# Patient Record
Sex: MALE | Race: ASIAN | Employment: OTHER | URBAN - METROPOLITAN AREA
[De-identification: names, ages, dates, MRNs, and addresses within clinical notes are randomized per-mention and may not be internally consistent; named-entity substitution may affect disease eponyms.]

---

## 2023-08-01 ENCOUNTER — APPOINTMENT (OUTPATIENT)
Dept: CT IMAGING | Facility: HOSPITAL | Age: 73
End: 2023-08-01
Attending: EMERGENCY MEDICINE

## 2023-08-01 ENCOUNTER — HOSPITAL ENCOUNTER (EMERGENCY)
Facility: HOSPITAL | Age: 73
Discharge: HOME | End: 2023-08-01
Attending: EMERGENCY MEDICINE

## 2023-08-01 ENCOUNTER — APPOINTMENT (OUTPATIENT)
Dept: ULTRASOUND IMAGING | Facility: HOSPITAL | Age: 73
End: 2023-08-01
Attending: EMERGENCY MEDICINE

## 2023-08-01 VITALS
BODY MASS INDEX: 21 KG/M2 | SYSTOLIC BLOOD PRESSURE: 150 MMHG | RESPIRATION RATE: 16 BRPM | HEART RATE: 53 BPM | DIASTOLIC BLOOD PRESSURE: 68 MMHG | WEIGHT: 123 LBS | OXYGEN SATURATION: 100 % | HEIGHT: 64.17 IN | TEMPERATURE: 99 F

## 2023-08-01 DIAGNOSIS — K80.20 CALCULUS OF GALLBLADDER WITHOUT CHOLECYSTITIS WITHOUT OBSTRUCTION: ICD-10-CM

## 2023-08-01 DIAGNOSIS — N18.9 ACUTE RENAL FAILURE SUPERIMPOSED ON CHRONIC KIDNEY DISEASE, UNSPECIFIED CKD STAGE, UNSPECIFIED ACUTE RENAL FAILURE TYPE: Primary | ICD-10-CM

## 2023-08-01 DIAGNOSIS — N19 UREMIA: ICD-10-CM

## 2023-08-01 DIAGNOSIS — N17.9 ACUTE RENAL FAILURE SUPERIMPOSED ON CHRONIC KIDNEY DISEASE, UNSPECIFIED CKD STAGE, UNSPECIFIED ACUTE RENAL FAILURE TYPE: Primary | ICD-10-CM

## 2023-08-01 PROBLEM — I10 PRIMARY HYPERTENSION: Status: ACTIVE | Noted: 2023-08-01

## 2023-08-01 PROBLEM — N18.5 CKD (CHRONIC KIDNEY DISEASE) STAGE 5, GFR LESS THAN 15 ML/MIN (HCC): Status: ACTIVE | Noted: 2023-08-01

## 2023-08-01 LAB
ALBUMIN SERPL-MCNC: 3.2 G/DL (ref 3.4–5)
ALBUMIN/GLOB SERPL: 0.7 {RATIO} (ref 1–2)
ALP LIVER SERPL-CCNC: 100 U/L
ALT SERPL-CCNC: 21 U/L
ANION GAP SERPL CALC-SCNC: 5 MMOL/L (ref 0–18)
AST SERPL-CCNC: 20 U/L (ref 15–37)
ATRIAL RATE: 57 BPM
BASOPHILS # BLD AUTO: 0.01 X10(3) UL (ref 0–0.2)
BASOPHILS NFR BLD AUTO: 0.1 %
BILIRUB SERPL-MCNC: 0.5 MG/DL (ref 0.1–2)
BILIRUB UR QL STRIP.AUTO: NEGATIVE
BUN BLD-MCNC: 61 MG/DL (ref 7–18)
CALCIUM BLD-MCNC: 8.8 MG/DL (ref 8.5–10.1)
CHLORIDE SERPL-SCNC: 104 MMOL/L (ref 98–112)
CLARITY UR REFRACT.AUTO: CLEAR
CO2 SERPL-SCNC: 27 MMOL/L (ref 21–32)
CREAT BLD-MCNC: 6.59 MG/DL
DEPRECATED HBV CORE AB SER IA-ACNC: 209.6 NG/ML
EGFRCR SERPLBLD CKD-EPI 2021: 8 ML/MIN/1.73M2 (ref 60–?)
EOSINOPHIL # BLD AUTO: 0.01 X10(3) UL (ref 0–0.7)
EOSINOPHIL NFR BLD AUTO: 0.1 %
ERYTHROCYTE [DISTWIDTH] IN BLOOD BY AUTOMATED COUNT: 14 %
GLOBULIN PLAS-MCNC: 4.8 G/DL (ref 2.8–4.4)
GLUCOSE BLD-MCNC: 115 MG/DL (ref 70–99)
GLUCOSE UR STRIP.AUTO-MCNC: NEGATIVE MG/DL
HCT VFR BLD AUTO: 35.2 %
HGB BLD-MCNC: 10.9 G/DL
IMM GRANULOCYTES # BLD AUTO: 0.05 X10(3) UL (ref 0–1)
IMM GRANULOCYTES NFR BLD: 0.4 %
IRON SATN MFR SERPL: 13 %
IRON SERPL-MCNC: 39 UG/DL
KETONES UR STRIP.AUTO-MCNC: NEGATIVE MG/DL
LEUKOCYTE ESTERASE UR QL STRIP.AUTO: NEGATIVE
LIPASE SERPL-CCNC: 101 U/L (ref 13–75)
LYMPHOCYTES # BLD AUTO: 1.44 X10(3) UL (ref 1–4)
LYMPHOCYTES NFR BLD AUTO: 12.1 %
MAGNESIUM SERPL-MCNC: 2.7 MG/DL (ref 1.6–2.6)
MCH RBC QN AUTO: 28.9 PG (ref 26–34)
MCHC RBC AUTO-ENTMCNC: 31 G/DL (ref 31–37)
MCV RBC AUTO: 93.4 FL
MONOCYTES # BLD AUTO: 0.75 X10(3) UL (ref 0.1–1)
MONOCYTES NFR BLD AUTO: 6.3 %
NEUTROPHILS # BLD AUTO: 9.61 X10 (3) UL (ref 1.5–7.7)
NEUTROPHILS # BLD AUTO: 9.61 X10(3) UL (ref 1.5–7.7)
NEUTROPHILS NFR BLD AUTO: 81 %
NITRITE UR QL STRIP.AUTO: NEGATIVE
OSMOLALITY SERPL CALC.SUM OF ELEC: 300 MOSM/KG (ref 275–295)
P AXIS: 50 DEGREES
P-R INTERVAL: 178 MS
PH UR STRIP.AUTO: 7 [PH] (ref 5–8)
PLATELET # BLD AUTO: 243 10(3)UL (ref 150–450)
POTASSIUM SERPL-SCNC: 4.8 MMOL/L (ref 3.5–5.1)
PROT SERPL-MCNC: 8 G/DL (ref 6.4–8.2)
PROT UR STRIP.AUTO-MCNC: 100 MG/DL
Q-T INTERVAL: 468 MS
QRS DURATION: 88 MS
QTC CALCULATION (BEZET): 455 MS
R AXIS: -13 DEGREES
RBC # BLD AUTO: 3.77 X10(6)UL
RBC UR QL AUTO: NEGATIVE
SODIUM SERPL-SCNC: 136 MMOL/L (ref 136–145)
SP GR UR STRIP.AUTO: 1.01 (ref 1–1.03)
T AXIS: 87 DEGREES
TIBC SERPL-MCNC: 301 UG/DL (ref 240–450)
TRANSFERRIN SERPL-MCNC: 202 MG/DL (ref 200–360)
TROPONIN I HIGH SENSITIVITY: 15 NG/L
UROBILINOGEN UR STRIP.AUTO-MCNC: <2 MG/DL
VENTRICULAR RATE: 57 BPM
WBC # BLD AUTO: 11.9 X10(3) UL (ref 4–11)

## 2023-08-01 PROCEDURE — 74176 CT ABD & PELVIS W/O CONTRAST: CPT | Performed by: EMERGENCY MEDICINE

## 2023-08-01 PROCEDURE — 76700 US EXAM ABDOM COMPLETE: CPT | Performed by: EMERGENCY MEDICINE

## 2023-08-01 PROCEDURE — 99205 OFFICE O/P NEW HI 60 MIN: CPT | Performed by: INTERNAL MEDICINE

## 2023-08-01 RX ORDER — HYDROMORPHONE HYDROCHLORIDE 1 MG/ML
0.2 INJECTION, SOLUTION INTRAMUSCULAR; INTRAVENOUS; SUBCUTANEOUS EVERY 2 HOUR PRN
Status: CANCELLED | OUTPATIENT
Start: 2023-08-01

## 2023-08-01 RX ORDER — SENNOSIDES 8.6 MG
17.2 TABLET ORAL NIGHTLY PRN
Status: CANCELLED | OUTPATIENT
Start: 2023-08-01

## 2023-08-01 RX ORDER — ACETAMINOPHEN 325 MG/1
650 TABLET ORAL EVERY 4 HOURS PRN
Status: CANCELLED | OUTPATIENT
Start: 2023-08-01

## 2023-08-01 RX ORDER — METOCLOPRAMIDE HYDROCHLORIDE 5 MG/ML
5 INJECTION INTRAMUSCULAR; INTRAVENOUS EVERY 8 HOURS PRN
Status: CANCELLED | OUTPATIENT
Start: 2023-08-01

## 2023-08-01 RX ORDER — SODIUM CHLORIDE 9 MG/ML
INJECTION, SOLUTION INTRAVENOUS CONTINUOUS
Status: DISCONTINUED | OUTPATIENT
Start: 2023-08-01 | End: 2023-08-01

## 2023-08-01 RX ORDER — ONDANSETRON 2 MG/ML
4 INJECTION INTRAMUSCULAR; INTRAVENOUS EVERY 6 HOURS PRN
Status: CANCELLED | OUTPATIENT
Start: 2023-08-01

## 2023-08-01 RX ORDER — HEPARIN SODIUM 5000 [USP'U]/ML
5000 INJECTION, SOLUTION INTRAVENOUS; SUBCUTANEOUS EVERY 12 HOURS SCHEDULED
Status: CANCELLED | OUTPATIENT
Start: 2023-08-01

## 2023-08-01 RX ORDER — HYDROMORPHONE HYDROCHLORIDE 1 MG/ML
0.4 INJECTION, SOLUTION INTRAMUSCULAR; INTRAVENOUS; SUBCUTANEOUS EVERY 2 HOUR PRN
Status: CANCELLED | OUTPATIENT
Start: 2023-08-01

## 2023-08-01 RX ORDER — POLYETHYLENE GLYCOL 3350 17 G/17G
17 POWDER, FOR SOLUTION ORAL DAILY PRN
Status: CANCELLED | OUTPATIENT
Start: 2023-08-01

## 2023-08-01 RX ORDER — HYDROMORPHONE HYDROCHLORIDE 1 MG/ML
0.8 INJECTION, SOLUTION INTRAMUSCULAR; INTRAVENOUS; SUBCUTANEOUS EVERY 2 HOUR PRN
Status: CANCELLED | OUTPATIENT
Start: 2023-08-01

## 2023-08-01 RX ORDER — ONDANSETRON 2 MG/ML
4 INJECTION INTRAMUSCULAR; INTRAVENOUS ONCE
Status: COMPLETED | OUTPATIENT
Start: 2023-08-01 | End: 2023-08-01

## 2023-08-01 RX ORDER — HYDROCODONE BITARTRATE AND ACETAMINOPHEN 5; 325 MG/1; MG/1
2 TABLET ORAL EVERY 4 HOURS PRN
Status: CANCELLED | OUTPATIENT
Start: 2023-08-01

## 2023-08-01 RX ORDER — SODIUM CHLORIDE 9 MG/ML
INJECTION, SOLUTION INTRAVENOUS CONTINUOUS
Status: CANCELLED | OUTPATIENT
Start: 2023-08-01

## 2023-08-01 RX ORDER — MORPHINE SULFATE 2 MG/ML
2 INJECTION, SOLUTION INTRAMUSCULAR; INTRAVENOUS ONCE
Status: COMPLETED | OUTPATIENT
Start: 2023-08-01 | End: 2023-08-01

## 2023-08-01 RX ORDER — MELATONIN
3 NIGHTLY PRN
Status: CANCELLED | OUTPATIENT
Start: 2023-08-01

## 2023-08-01 RX ORDER — HYDROCODONE BITARTRATE AND ACETAMINOPHEN 5; 325 MG/1; MG/1
1 TABLET ORAL EVERY 4 HOURS PRN
Status: CANCELLED | OUTPATIENT
Start: 2023-08-01

## 2023-08-01 RX ORDER — BISACODYL 10 MG
10 SUPPOSITORY, RECTAL RECTAL
Status: CANCELLED | OUTPATIENT
Start: 2023-08-01

## 2023-08-01 NOTE — ED INITIAL ASSESSMENT (HPI)
C.o abd and back pain since 5am today. Denies n/v/d. Endorses difficulty urinating sts he feels the urge but unable to empty. Aox4. Rr even nonlabored.

## 2023-08-07 ENCOUNTER — APPOINTMENT (OUTPATIENT)
Dept: GENERAL RADIOLOGY | Facility: HOSPITAL | Age: 73
End: 2023-08-07
Attending: EMERGENCY MEDICINE

## 2023-08-07 ENCOUNTER — APPOINTMENT (OUTPATIENT)
Dept: GENERAL RADIOLOGY | Facility: HOSPITAL | Age: 73
End: 2023-08-07

## 2023-08-07 ENCOUNTER — HOSPITAL ENCOUNTER (EMERGENCY)
Facility: HOSPITAL | Age: 73
Discharge: HOME | End: 2023-08-07
Attending: EMERGENCY MEDICINE

## 2023-08-07 VITALS
BODY MASS INDEX: 19.77 KG/M2 | RESPIRATION RATE: 16 BRPM | OXYGEN SATURATION: 98 % | WEIGHT: 123 LBS | HEART RATE: 58 BPM | HEIGHT: 66 IN | DIASTOLIC BLOOD PRESSURE: 78 MMHG | TEMPERATURE: 98 F | SYSTOLIC BLOOD PRESSURE: 132 MMHG

## 2023-08-07 DIAGNOSIS — K59.00 CONSTIPATION, UNSPECIFIED CONSTIPATION TYPE: ICD-10-CM

## 2023-08-07 DIAGNOSIS — R06.02 SHORTNESS OF BREATH: Primary | ICD-10-CM

## 2023-08-07 DIAGNOSIS — N18.9 CHRONIC KIDNEY DISEASE, UNSPECIFIED CKD STAGE: ICD-10-CM

## 2023-08-07 LAB
ALBUMIN SERPL-MCNC: 3 G/DL (ref 3.4–5)
ALBUMIN/GLOB SERPL: 0.7 {RATIO} (ref 1–2)
ALP LIVER SERPL-CCNC: 91 U/L
ALT SERPL-CCNC: 21 U/L
ANION GAP SERPL CALC-SCNC: 7 MMOL/L (ref 0–18)
AST SERPL-CCNC: 22 U/L (ref 15–37)
ATRIAL RATE: 68 BPM
BASOPHILS # BLD AUTO: 0.02 X10(3) UL (ref 0–0.2)
BASOPHILS NFR BLD AUTO: 0.2 %
BILIRUB SERPL-MCNC: 0.5 MG/DL (ref 0.1–2)
BUN BLD-MCNC: 57 MG/DL (ref 7–18)
CALCIUM BLD-MCNC: 8.5 MG/DL (ref 8.5–10.1)
CHLORIDE SERPL-SCNC: 99 MMOL/L (ref 98–112)
CO2 SERPL-SCNC: 29 MMOL/L (ref 21–32)
CREAT BLD-MCNC: 6.9 MG/DL
EGFRCR SERPLBLD CKD-EPI 2021: 8 ML/MIN/1.73M2 (ref 60–?)
EOSINOPHIL # BLD AUTO: 0.03 X10(3) UL (ref 0–0.7)
EOSINOPHIL NFR BLD AUTO: 0.3 %
ERYTHROCYTE [DISTWIDTH] IN BLOOD BY AUTOMATED COUNT: 13 %
GLOBULIN PLAS-MCNC: 4.5 G/DL (ref 2.8–4.4)
GLUCOSE BLD-MCNC: 211 MG/DL (ref 70–99)
HCT VFR BLD AUTO: 32.1 %
HGB BLD-MCNC: 10.5 G/DL
IMM GRANULOCYTES # BLD AUTO: 0.05 X10(3) UL (ref 0–1)
IMM GRANULOCYTES NFR BLD: 0.5 %
LIPASE SERPL-CCNC: 94 U/L (ref 13–75)
LYMPHOCYTES # BLD AUTO: 1.52 X10(3) UL (ref 1–4)
LYMPHOCYTES NFR BLD AUTO: 15.5 %
MCH RBC QN AUTO: 29.4 PG (ref 26–34)
MCHC RBC AUTO-ENTMCNC: 32.7 G/DL (ref 31–37)
MCV RBC AUTO: 89.9 FL
MONOCYTES # BLD AUTO: 0.88 X10(3) UL (ref 0.1–1)
MONOCYTES NFR BLD AUTO: 9 %
NEUTROPHILS # BLD AUTO: 7.29 X10 (3) UL (ref 1.5–7.7)
NEUTROPHILS # BLD AUTO: 7.29 X10(3) UL (ref 1.5–7.7)
NEUTROPHILS NFR BLD AUTO: 74.5 %
NT-PROBNP SERPL-MCNC: ABNORMAL PG/ML (ref ?–125)
OSMOLALITY SERPL CALC.SUM OF ELEC: 302 MOSM/KG (ref 275–295)
P AXIS: 23 DEGREES
P-R INTERVAL: 178 MS
PLATELET # BLD AUTO: 232 10(3)UL (ref 150–450)
POTASSIUM SERPL-SCNC: 5 MMOL/L (ref 3.5–5.1)
PROT SERPL-MCNC: 7.5 G/DL (ref 6.4–8.2)
Q-T INTERVAL: 420 MS
QRS DURATION: 86 MS
QTC CALCULATION (BEZET): 446 MS
R AXIS: -20 DEGREES
RBC # BLD AUTO: 3.57 X10(6)UL
SODIUM SERPL-SCNC: 135 MMOL/L (ref 136–145)
T AXIS: 83 DEGREES
TROPONIN I HIGH SENSITIVITY: 21 NG/L
VENTRICULAR RATE: 68 BPM
WBC # BLD AUTO: 9.8 X10(3) UL (ref 4–11)

## 2023-08-07 PROCEDURE — 93010 ELECTROCARDIOGRAM REPORT: CPT

## 2023-08-07 PROCEDURE — 93005 ELECTROCARDIOGRAM TRACING: CPT

## 2023-08-07 PROCEDURE — 84484 ASSAY OF TROPONIN QUANT: CPT | Performed by: EMERGENCY MEDICINE

## 2023-08-07 PROCEDURE — 83690 ASSAY OF LIPASE: CPT | Performed by: EMERGENCY MEDICINE

## 2023-08-07 PROCEDURE — 80053 COMPREHEN METABOLIC PANEL: CPT | Performed by: EMERGENCY MEDICINE

## 2023-08-07 PROCEDURE — 85025 COMPLETE CBC W/AUTO DIFF WBC: CPT | Performed by: EMERGENCY MEDICINE

## 2023-08-07 PROCEDURE — 71045 X-RAY EXAM CHEST 1 VIEW: CPT | Performed by: EMERGENCY MEDICINE

## 2023-08-07 PROCEDURE — 96374 THER/PROPH/DIAG INJ IV PUSH: CPT

## 2023-08-07 PROCEDURE — 83880 ASSAY OF NATRIURETIC PEPTIDE: CPT | Performed by: EMERGENCY MEDICINE

## 2023-08-07 PROCEDURE — 99285 EMERGENCY DEPT VISIT HI MDM: CPT

## 2023-08-07 PROCEDURE — 74019 RADEX ABDOMEN 2 VIEWS: CPT | Performed by: EMERGENCY MEDICINE

## 2023-08-07 RX ORDER — FUROSEMIDE 10 MG/ML
40 INJECTION INTRAMUSCULAR; INTRAVENOUS ONCE
Status: COMPLETED | OUTPATIENT
Start: 2023-08-07 | End: 2023-08-07

## 2023-08-07 RX ORDER — FOLIC ACID 1 MG/1
1 TABLET ORAL DAILY
COMMUNITY

## 2023-08-07 RX ORDER — ROSUVASTATIN CALCIUM 10 MG/1
10 TABLET, COATED ORAL DAILY
COMMUNITY

## 2023-08-07 RX ORDER — REPAGLINIDE 0.5 MG/1
0.5 TABLET ORAL
COMMUNITY

## 2023-08-07 RX ORDER — DOCUSATE SODIUM 100 MG/1
100 CAPSULE, LIQUID FILLED ORAL 2 TIMES DAILY
Qty: 60 CAPSULE | Refills: 0 | Status: SHIPPED | OUTPATIENT
Start: 2023-08-07 | End: 2023-09-06

## 2023-08-07 RX ORDER — TAMSULOSIN HYDROCHLORIDE 0.4 MG/1
0.4 CAPSULE ORAL DAILY
COMMUNITY

## 2023-08-07 RX ORDER — TORSEMIDE 10 MG/1
10 TABLET ORAL DAILY
COMMUNITY

## 2023-08-07 NOTE — ED INITIAL ASSESSMENT (HPI)
Pt here with son, states pt did not sleep last night due to chintan. Thinks that he's retaining water.

## 2023-08-07 NOTE — DISCHARGE INSTRUCTIONS
Continue home medications. Continue MiraLAX once per day. You can add the docusate twice per day as well.

## 2023-08-21 ENCOUNTER — HOSPITAL ENCOUNTER (EMERGENCY)
Facility: HOSPITAL | Age: 73
Discharge: LEFT BEFORE DISCHARGE | End: 2023-08-21
Attending: EMERGENCY MEDICINE

## 2023-08-21 ENCOUNTER — APPOINTMENT (OUTPATIENT)
Dept: CT IMAGING | Facility: HOSPITAL | Age: 73
End: 2023-08-21
Attending: EMERGENCY MEDICINE

## 2023-08-21 ENCOUNTER — APPOINTMENT (OUTPATIENT)
Dept: GENERAL RADIOLOGY | Facility: HOSPITAL | Age: 73
End: 2023-08-21
Attending: EMERGENCY MEDICINE

## 2023-08-21 VITALS
SYSTOLIC BLOOD PRESSURE: 126 MMHG | OXYGEN SATURATION: 99 % | DIASTOLIC BLOOD PRESSURE: 60 MMHG | TEMPERATURE: 99 F | RESPIRATION RATE: 16 BRPM | HEART RATE: 67 BPM

## 2023-08-21 DIAGNOSIS — K81.0 ACUTE CHOLECYSTITIS: Primary | ICD-10-CM

## 2023-08-21 DIAGNOSIS — D72.829 LEUKOCYTOSIS, UNSPECIFIED TYPE: ICD-10-CM

## 2023-08-21 LAB
ALBUMIN SERPL-MCNC: 2.6 G/DL (ref 3.4–5)
ALBUMIN/GLOB SERPL: 0.5 {RATIO} (ref 1–2)
ALP LIVER SERPL-CCNC: 98 U/L
ALT SERPL-CCNC: 42 U/L
ANION GAP SERPL CALC-SCNC: 9 MMOL/L (ref 0–18)
AST SERPL-CCNC: 40 U/L (ref 15–37)
BASOPHILS # BLD AUTO: 0.05 X10(3) UL (ref 0–0.2)
BASOPHILS NFR BLD AUTO: 0.2 %
BILIRUB SERPL-MCNC: 0.5 MG/DL (ref 0.1–2)
BILIRUB UR QL STRIP.AUTO: NEGATIVE
BUN BLD-MCNC: 64 MG/DL (ref 7–18)
CALCIUM BLD-MCNC: 8.4 MG/DL (ref 8.5–10.1)
CHLORIDE SERPL-SCNC: 102 MMOL/L (ref 98–112)
CLARITY UR REFRACT.AUTO: CLEAR
CO2 SERPL-SCNC: 23 MMOL/L (ref 21–32)
CREAT BLD-MCNC: 7.09 MG/DL
EGFRCR SERPLBLD CKD-EPI 2021: 8 ML/MIN/1.73M2 (ref 60–?)
EOSINOPHIL # BLD AUTO: 0 X10(3) UL (ref 0–0.7)
EOSINOPHIL NFR BLD AUTO: 0 %
ERYTHROCYTE [DISTWIDTH] IN BLOOD BY AUTOMATED COUNT: 12.9 %
GLOBULIN PLAS-MCNC: 5.1 G/DL (ref 2.8–4.4)
GLUCOSE BLD-MCNC: 212 MG/DL (ref 70–99)
GLUCOSE UR STRIP.AUTO-MCNC: 70 MG/DL
HCT VFR BLD AUTO: 33 %
HGB BLD-MCNC: 10.4 G/DL
IMM GRANULOCYTES # BLD AUTO: 0.19 X10(3) UL (ref 0–1)
IMM GRANULOCYTES NFR BLD: 0.8 %
KETONES UR STRIP.AUTO-MCNC: NEGATIVE MG/DL
LACTATE SERPL-SCNC: 1.4 MMOL/L (ref 0.4–2)
LEUKOCYTE ESTERASE UR QL STRIP.AUTO: NEGATIVE
LIPASE SERPL-CCNC: 55 U/L (ref 13–75)
LYMPHOCYTES # BLD AUTO: 0.81 X10(3) UL (ref 1–4)
LYMPHOCYTES NFR BLD AUTO: 3.5 %
MCH RBC QN AUTO: 28.2 PG (ref 26–34)
MCHC RBC AUTO-ENTMCNC: 31.5 G/DL (ref 31–37)
MCV RBC AUTO: 89.4 FL
MONOCYTES # BLD AUTO: 1.24 X10(3) UL (ref 0.1–1)
MONOCYTES NFR BLD AUTO: 5.4 %
NEUTROPHILS # BLD AUTO: 20.75 X10 (3) UL (ref 1.5–7.7)
NEUTROPHILS # BLD AUTO: 20.75 X10(3) UL (ref 1.5–7.7)
NEUTROPHILS NFR BLD AUTO: 90.1 %
NITRITE UR QL STRIP.AUTO: NEGATIVE
OSMOLALITY SERPL CALC.SUM OF ELEC: 303 MOSM/KG (ref 275–295)
PH UR STRIP.AUTO: 6 [PH] (ref 5–8)
PLATELET # BLD AUTO: 334 10(3)UL (ref 150–450)
POTASSIUM SERPL-SCNC: 4.4 MMOL/L (ref 3.5–5.1)
PROT SERPL-MCNC: 7.7 G/DL (ref 6.4–8.2)
PROT UR STRIP.AUTO-MCNC: 200 MG/DL
RBC # BLD AUTO: 3.69 X10(6)UL
RBC UR QL AUTO: NEGATIVE
SARS-COV-2 RNA RESP QL NAA+PROBE: NOT DETECTED
SODIUM SERPL-SCNC: 134 MMOL/L (ref 136–145)
SP GR UR STRIP.AUTO: 1.01 (ref 1–1.03)
UROBILINOGEN UR STRIP.AUTO-MCNC: NORMAL MG/DL
WBC # BLD AUTO: 23 X10(3) UL (ref 4–11)

## 2023-08-21 PROCEDURE — 80053 COMPREHEN METABOLIC PANEL: CPT

## 2023-08-21 PROCEDURE — 71045 X-RAY EXAM CHEST 1 VIEW: CPT | Performed by: EMERGENCY MEDICINE

## 2023-08-21 PROCEDURE — 83690 ASSAY OF LIPASE: CPT | Performed by: EMERGENCY MEDICINE

## 2023-08-21 PROCEDURE — 83605 ASSAY OF LACTIC ACID: CPT | Performed by: EMERGENCY MEDICINE

## 2023-08-21 PROCEDURE — 87040 BLOOD CULTURE FOR BACTERIA: CPT | Performed by: EMERGENCY MEDICINE

## 2023-08-21 PROCEDURE — 85025 COMPLETE CBC W/AUTO DIFF WBC: CPT | Performed by: EMERGENCY MEDICINE

## 2023-08-21 PROCEDURE — 74176 CT ABD & PELVIS W/O CONTRAST: CPT | Performed by: EMERGENCY MEDICINE

## 2023-08-21 PROCEDURE — 85025 COMPLETE CBC W/AUTO DIFF WBC: CPT

## 2023-08-21 PROCEDURE — 81001 URINALYSIS AUTO W/SCOPE: CPT | Performed by: EMERGENCY MEDICINE

## 2023-08-21 PROCEDURE — 99285 EMERGENCY DEPT VISIT HI MDM: CPT

## 2023-08-21 PROCEDURE — 96365 THER/PROPH/DIAG IV INF INIT: CPT

## 2023-08-21 PROCEDURE — 83690 ASSAY OF LIPASE: CPT

## 2023-08-21 PROCEDURE — 36415 COLL VENOUS BLD VENIPUNCTURE: CPT

## 2023-08-21 PROCEDURE — 80053 COMPREHEN METABOLIC PANEL: CPT | Performed by: EMERGENCY MEDICINE

## 2023-08-21 PROCEDURE — 96361 HYDRATE IV INFUSION ADD-ON: CPT

## 2023-08-21 RX ORDER — METRONIDAZOLE 500 MG/1
500 TABLET ORAL ONCE
Status: COMPLETED | OUTPATIENT
Start: 2023-08-21 | End: 2023-08-21

## 2023-08-21 NOTE — ED INITIAL ASSESSMENT (HPI)
Pt BIB son who states pt developed fever and vomiting this afternoon with some delirium. Pt is chronic kidney pt. Afebrile at this time.

## 2023-08-22 ENCOUNTER — HOSPITAL ENCOUNTER (INPATIENT)
Facility: HOSPITAL | Age: 73
LOS: 1 days | Discharge: HOME | End: 2023-08-23
Attending: EMERGENCY MEDICINE | Admitting: HOSPITALIST

## 2023-08-22 DIAGNOSIS — K81.0 ACUTE CHOLECYSTITIS: Primary | ICD-10-CM

## 2023-08-22 NOTE — ED QUICK NOTES
Family at bedside. Updated on status. Discussed leaving AMA. Pts family member voice understanding that pt is leaving against the doctors medical advice.

## 2023-08-23 ENCOUNTER — APPOINTMENT (OUTPATIENT)
Dept: ULTRASOUND IMAGING | Facility: HOSPITAL | Age: 73
End: 2023-08-23
Attending: EMERGENCY MEDICINE

## 2023-08-23 VITALS
HEIGHT: 64 IN | DIASTOLIC BLOOD PRESSURE: 65 MMHG | RESPIRATION RATE: 18 BRPM | BODY MASS INDEX: 19.97 KG/M2 | HEART RATE: 66 BPM | TEMPERATURE: 98 F | OXYGEN SATURATION: 99 % | WEIGHT: 117 LBS | SYSTOLIC BLOOD PRESSURE: 140 MMHG

## 2023-08-23 PROBLEM — K81.0 ACUTE CHOLECYSTITIS: Status: ACTIVE | Noted: 2023-08-23

## 2023-08-23 PROBLEM — N40.0 BPH (BENIGN PROSTATIC HYPERPLASIA): Chronic | Status: ACTIVE | Noted: 2023-08-23

## 2023-08-23 PROBLEM — E11.9 TYPE 2 DIABETES MELLITUS WITHOUT COMPLICATION, WITHOUT LONG-TERM CURRENT USE OF INSULIN (HCC): Status: ACTIVE | Noted: 2023-08-23

## 2023-08-23 PROBLEM — N17.9 ACUTE RENAL FAILURE (ARF) (HCC): Status: ACTIVE | Noted: 2023-08-23

## 2023-08-23 LAB
ALBUMIN SERPL-MCNC: 2.7 G/DL (ref 3.4–5)
ALBUMIN/GLOB SERPL: 0.6 {RATIO} (ref 1–2)
ALP LIVER SERPL-CCNC: 97 U/L
ALT SERPL-CCNC: 31 U/L
ANION GAP SERPL CALC-SCNC: 9 MMOL/L (ref 0–18)
AST SERPL-CCNC: 39 U/L (ref 15–37)
BASOPHILS # BLD AUTO: 0.05 X10(3) UL (ref 0–0.2)
BASOPHILS NFR BLD AUTO: 0.4 %
BILIRUB SERPL-MCNC: 0.3 MG/DL (ref 0.1–2)
BILIRUB UR QL STRIP.AUTO: NEGATIVE
BUN BLD-MCNC: 71 MG/DL (ref 7–18)
CALCIUM BLD-MCNC: 8.6 MG/DL (ref 8.5–10.1)
CHLORIDE SERPL-SCNC: 101 MMOL/L (ref 98–112)
CLARITY UR REFRACT.AUTO: CLEAR
CO2 SERPL-SCNC: 26 MMOL/L (ref 21–32)
COLOR UR AUTO: COLORLESS
CREAT BLD-MCNC: 7.18 MG/DL
EGFRCR SERPLBLD CKD-EPI 2021: 7 ML/MIN/1.73M2 (ref 60–?)
EOSINOPHIL # BLD AUTO: 0.03 X10(3) UL (ref 0–0.7)
EOSINOPHIL NFR BLD AUTO: 0.2 %
ERYTHROCYTE [DISTWIDTH] IN BLOOD BY AUTOMATED COUNT: 13.2 %
EST. AVERAGE GLUCOSE BLD GHB EST-MCNC: 154 MG/DL (ref 68–126)
GLOBULIN PLAS-MCNC: 4.9 G/DL (ref 2.8–4.4)
GLUCOSE BLD-MCNC: 126 MG/DL (ref 70–99)
GLUCOSE BLD-MCNC: 291 MG/DL (ref 70–99)
GLUCOSE UR STRIP.AUTO-MCNC: 500 MG/DL
HBA1C MFR BLD: 7 % (ref ?–5.7)
HCT VFR BLD AUTO: 31.6 %
HGB BLD-MCNC: 10.3 G/DL
IMM GRANULOCYTES # BLD AUTO: 0.07 X10(3) UL (ref 0–1)
IMM GRANULOCYTES NFR BLD: 0.6 %
KETONES UR STRIP.AUTO-MCNC: NEGATIVE MG/DL
LEUKOCYTE ESTERASE UR QL STRIP.AUTO: NEGATIVE
LIPASE SERPL-CCNC: 157 U/L (ref 13–75)
LYMPHOCYTES # BLD AUTO: 1.85 X10(3) UL (ref 1–4)
LYMPHOCYTES NFR BLD AUTO: 15.3 %
MCH RBC QN AUTO: 29 PG (ref 26–34)
MCHC RBC AUTO-ENTMCNC: 32.6 G/DL (ref 31–37)
MCV RBC AUTO: 89 FL
MONOCYTES # BLD AUTO: 0.97 X10(3) UL (ref 0.1–1)
MONOCYTES NFR BLD AUTO: 8 %
NEUTROPHILS # BLD AUTO: 9.13 X10 (3) UL (ref 1.5–7.7)
NEUTROPHILS # BLD AUTO: 9.13 X10(3) UL (ref 1.5–7.7)
NEUTROPHILS NFR BLD AUTO: 75.5 %
NITRITE UR QL STRIP.AUTO: NEGATIVE
OSMOLALITY SERPL CALC.SUM OF ELEC: 314 MOSM/KG (ref 275–295)
PH UR STRIP.AUTO: 6.5 [PH] (ref 5–8)
PLATELET # BLD AUTO: 314 10(3)UL (ref 150–450)
PLATELET MORPHOLOGY: NORMAL
POTASSIUM SERPL-SCNC: 4.8 MMOL/L (ref 3.5–5.1)
PROT SERPL-MCNC: 7.6 G/DL (ref 6.4–8.2)
PROT UR STRIP.AUTO-MCNC: 70 MG/DL
RBC # BLD AUTO: 3.55 X10(6)UL
RBC UR QL AUTO: NEGATIVE
SODIUM SERPL-SCNC: 136 MMOL/L (ref 136–145)
SP GR UR STRIP.AUTO: 1.01 (ref 1–1.03)
UROBILINOGEN UR STRIP.AUTO-MCNC: NORMAL MG/DL
WBC # BLD AUTO: 12.1 X10(3) UL (ref 4–11)

## 2023-08-23 PROCEDURE — 99254 IP/OBS CNSLTJ NEW/EST MOD 60: CPT | Performed by: SURGERY

## 2023-08-23 PROCEDURE — 99223 1ST HOSP IP/OBS HIGH 75: CPT | Performed by: HOSPITALIST

## 2023-08-23 PROCEDURE — 76705 ECHO EXAM OF ABDOMEN: CPT | Performed by: EMERGENCY MEDICINE

## 2023-08-23 RX ORDER — DEXTROSE MONOHYDRATE 25 G/50ML
50 INJECTION, SOLUTION INTRAVENOUS
Status: DISCONTINUED | OUTPATIENT
Start: 2023-08-23 | End: 2023-08-23

## 2023-08-23 RX ORDER — LEVOTHYROXINE SODIUM 0.07 MG/1
75 TABLET ORAL
COMMUNITY

## 2023-08-23 RX ORDER — MORPHINE SULFATE 4 MG/ML
4 INJECTION, SOLUTION INTRAMUSCULAR; INTRAVENOUS EVERY 2 HOUR PRN
Status: DISCONTINUED | OUTPATIENT
Start: 2023-08-23 | End: 2023-08-23

## 2023-08-23 RX ORDER — FEBUXOSTAT 40 MG/1
40 TABLET, FILM COATED ORAL DAILY
COMMUNITY

## 2023-08-23 RX ORDER — TAMSULOSIN HYDROCHLORIDE 0.4 MG/1
0.4 CAPSULE ORAL DAILY
Status: DISCONTINUED | OUTPATIENT
Start: 2023-08-23 | End: 2023-08-23

## 2023-08-23 RX ORDER — MORPHINE SULFATE 2 MG/ML
2 INJECTION, SOLUTION INTRAMUSCULAR; INTRAVENOUS EVERY 2 HOUR PRN
Status: DISCONTINUED | OUTPATIENT
Start: 2023-08-23 | End: 2023-08-23

## 2023-08-23 RX ORDER — MELATONIN
3 NIGHTLY PRN
Status: DISCONTINUED | OUTPATIENT
Start: 2023-08-23 | End: 2023-08-23

## 2023-08-23 RX ORDER — SODIUM CHLORIDE 9 MG/ML
INJECTION, SOLUTION INTRAVENOUS CONTINUOUS
Status: DISCONTINUED | OUTPATIENT
Start: 2023-08-23 | End: 2023-08-23

## 2023-08-23 RX ORDER — MORPHINE SULFATE 2 MG/ML
1 INJECTION, SOLUTION INTRAMUSCULAR; INTRAVENOUS EVERY 2 HOUR PRN
Status: DISCONTINUED | OUTPATIENT
Start: 2023-08-23 | End: 2023-08-23

## 2023-08-23 RX ORDER — NICOTINE POLACRILEX 4 MG
15 LOZENGE BUCCAL
Status: DISCONTINUED | OUTPATIENT
Start: 2023-08-23 | End: 2023-08-23

## 2023-08-23 RX ORDER — NICOTINE POLACRILEX 4 MG
30 LOZENGE BUCCAL
Status: DISCONTINUED | OUTPATIENT
Start: 2023-08-23 | End: 2023-08-23

## 2023-08-23 RX ORDER — METRONIDAZOLE 500 MG/100ML
500 INJECTION, SOLUTION INTRAVENOUS ONCE
Status: COMPLETED | OUTPATIENT
Start: 2023-08-23 | End: 2023-08-23

## 2023-08-23 RX ORDER — AMOXICILLIN AND CLAVULANATE POTASSIUM 250; 125 MG/1; MG/1
250 TABLET, FILM COATED ORAL EVERY 12 HOURS SCHEDULED
Qty: 10 TABLET | Refills: 0 | Status: SHIPPED | OUTPATIENT
Start: 2023-08-23

## 2023-08-23 RX ORDER — METRONIDAZOLE 500 MG/100ML
500 INJECTION, SOLUTION INTRAVENOUS EVERY 8 HOURS
Status: DISCONTINUED | OUTPATIENT
Start: 2023-08-23 | End: 2023-08-23

## 2023-08-23 RX ORDER — ONDANSETRON 4 MG/1
4 TABLET, FILM COATED ORAL EVERY 8 HOURS PRN
Qty: 15 TABLET | Refills: 0 | Status: SHIPPED | OUTPATIENT
Start: 2023-08-23

## 2023-08-23 RX ORDER — METOCLOPRAMIDE HYDROCHLORIDE 5 MG/ML
5 INJECTION INTRAMUSCULAR; INTRAVENOUS EVERY 8 HOURS PRN
Status: DISCONTINUED | OUTPATIENT
Start: 2023-08-23 | End: 2023-08-23

## 2023-08-23 RX ORDER — ONDANSETRON 2 MG/ML
4 INJECTION INTRAMUSCULAR; INTRAVENOUS EVERY 6 HOURS PRN
Status: DISCONTINUED | OUTPATIENT
Start: 2023-08-23 | End: 2023-08-23

## 2023-08-23 RX ORDER — HEPARIN SODIUM 5000 [USP'U]/ML
5000 INJECTION, SOLUTION INTRAVENOUS; SUBCUTANEOUS EVERY 8 HOURS SCHEDULED
Status: DISCONTINUED | OUTPATIENT
Start: 2023-08-23 | End: 2023-08-23

## 2023-08-23 RX ORDER — LEVOTHYROXINE SODIUM 0.07 MG/1
75 TABLET ORAL
Status: DISCONTINUED | OUTPATIENT
Start: 2023-08-23 | End: 2023-08-23

## 2023-08-23 NOTE — PROGRESS NOTES
Pt declining surgery, wants to go home. Per Dr Brett Crisostomo, placing patient on soft diet, can DC if tolerating.  Will update hospitalist.

## 2023-08-23 NOTE — PROGRESS NOTES
Patient discharged to home. Reviewed discharge instructions at bedside with patient and son. All questions answered. Antibiotic and zofran sent electronically to patient pharmacy. Copy of labs and US report provided to patient at his request.  Patient escorted via wheelchair to [facility] entrance and released to the care of his family.

## 2023-08-23 NOTE — PLAN OF CARE
NURSING ADMISSION NOTE      Patient admitted via [address] with son at bedside. Oriented to room. Safety precautions initiated. Bed in low position. Call light in reach. Admission data base completed with son. Pt seen  by Wiliam Gusman. Pt npo since 8pm per son. Aware of npo status till seen by sx. Awaiting orders per hospitalist. Pt voiding freely denies pain or nausea at this time. Will continue to monitor. VSS.

## 2023-08-23 NOTE — DISCHARGE INSTRUCTIONS
Take all of your antibiotic as ordered until completed. Antibiotics can cause stomach upset, take with food to minimize effect  Drink plenty of fluids, keep yourself hydrated. Soft low fat diet, may slowly increase back to your regular home diet as your symptoms improve  Activity as tolerated.  Avoid strenuous exercise and activity      Notify a MD for-  Increased pain, uncontrolled with medications  Increased or worsening nausea and vomiting  Fever 101 or greater, recurrent  Inability to tolerate a diet  Diarrhea lasting more than 1-2 days

## 2023-08-24 ENCOUNTER — PATIENT OUTREACH (OUTPATIENT)
Dept: CASE MANAGEMENT | Age: 73
End: 2023-08-24

## 2023-08-24 PROBLEM — K80.00 CALCULUS OF GALLBLADDER WITH ACUTE CHOLECYSTITIS WITHOUT OBSTRUCTION: Status: ACTIVE | Noted: 2023-08-24

## 2023-08-24 NOTE — PROGRESS NOTES
LM for pt to call [facility] for condition update since discharge. [facility] phone number was provided for pt to call back.

## 2023-08-24 NOTE — PROGRESS NOTES
[facility] contact information provided as well as [facility] office number, [phone].

## 2024-04-27 ENCOUNTER — HOSPITAL ENCOUNTER (EMERGENCY)
Facility: HOSPITAL | Age: 74
Discharge: HOME | End: 2024-04-27
Attending: EMERGENCY MEDICINE
Payer: MEDICAID

## 2024-04-27 ENCOUNTER — APPOINTMENT (OUTPATIENT)
Dept: GENERAL RADIOLOGY | Facility: HOSPITAL | Age: 74
End: 2024-04-27
Attending: EMERGENCY MEDICINE
Payer: MEDICAID

## 2024-04-27 ENCOUNTER — APPOINTMENT (OUTPATIENT)
Dept: ULTRASOUND IMAGING | Facility: HOSPITAL | Age: 74
End: 2024-04-27
Attending: EMERGENCY MEDICINE
Payer: MEDICAID

## 2024-04-27 VITALS
HEART RATE: 55 BPM | HEIGHT: 65 IN | OXYGEN SATURATION: 100 % | TEMPERATURE: 99 F | RESPIRATION RATE: 14 BRPM | DIASTOLIC BLOOD PRESSURE: 61 MMHG | SYSTOLIC BLOOD PRESSURE: 149 MMHG | WEIGHT: 108 LBS | BODY MASS INDEX: 17.99 KG/M2

## 2024-04-27 DIAGNOSIS — E86.0 DEHYDRATION: ICD-10-CM

## 2024-04-27 DIAGNOSIS — R11.2 NAUSEA AND VOMITING, UNSPECIFIED VOMITING TYPE: Primary | ICD-10-CM

## 2024-04-27 LAB
ALBUMIN SERPL-MCNC: 2.9 G/DL (ref 3.4–5)
ALBUMIN/GLOB SERPL: 0.6 {RATIO} (ref 1–2)
ALP LIVER SERPL-CCNC: 119 U/L
ALT SERPL-CCNC: 17 U/L
ANION GAP SERPL CALC-SCNC: 10 MMOL/L (ref 0–18)
AST SERPL-CCNC: 20 U/L (ref 15–37)
BASOPHILS # BLD AUTO: 0.03 X10(3) UL (ref 0–0.2)
BASOPHILS NFR BLD AUTO: 0.2 %
BILIRUB SERPL-MCNC: 0.3 MG/DL (ref 0.1–2)
BILIRUB UR QL STRIP.AUTO: NEGATIVE
BUN BLD-MCNC: 107 MG/DL (ref 9–23)
CALCIUM BLD-MCNC: 8.5 MG/DL (ref 8.5–10.1)
CHLORIDE SERPL-SCNC: 104 MMOL/L (ref 98–112)
CLARITY UR REFRACT.AUTO: CLEAR
CO2 SERPL-SCNC: 20 MMOL/L (ref 21–32)
COLOR UR AUTO: COLORLESS
CREAT BLD-MCNC: 8.88 MG/DL
EGFRCR SERPLBLD CKD-EPI 2021: 6 ML/MIN/1.73M2 (ref 60–?)
EOSINOPHIL # BLD AUTO: 0.02 X10(3) UL (ref 0–0.7)
EOSINOPHIL NFR BLD AUTO: 0.2 %
ERYTHROCYTE [DISTWIDTH] IN BLOOD BY AUTOMATED COUNT: 12.5 %
FLUAV + FLUBV RNA SPEC NAA+PROBE: NEGATIVE
FLUAV + FLUBV RNA SPEC NAA+PROBE: NEGATIVE
GLOBULIN PLAS-MCNC: 5.1 G/DL (ref 2.8–4.4)
GLUCOSE BLD-MCNC: 187 MG/DL (ref 70–99)
GLUCOSE UR STRIP.AUTO-MCNC: 50 MG/DL
HCT VFR BLD AUTO: 31.3 %
HGB BLD-MCNC: 10.4 G/DL
IMM GRANULOCYTES # BLD AUTO: 0.11 X10(3) UL (ref 0–1)
IMM GRANULOCYTES NFR BLD: 0.9 %
KETONES UR STRIP.AUTO-MCNC: NEGATIVE MG/DL
LACTATE SERPL-SCNC: 1.3 MMOL/L (ref 0.4–2)
LEUKOCYTE ESTERASE UR QL STRIP.AUTO: NEGATIVE
LIPASE SERPL-CCNC: 83 U/L (ref 13–75)
LYMPHOCYTES # BLD AUTO: 0.99 X10(3) UL (ref 1–4)
LYMPHOCYTES NFR BLD AUTO: 8.2 %
MCH RBC QN AUTO: 29.9 PG (ref 26–34)
MCHC RBC AUTO-ENTMCNC: 33.2 G/DL (ref 31–37)
MCV RBC AUTO: 89.9 FL
MONOCYTES # BLD AUTO: 1 X10(3) UL (ref 0.1–1)
MONOCYTES NFR BLD AUTO: 8.3 %
NEUTROPHILS # BLD AUTO: 9.91 X10 (3) UL (ref 1.5–7.7)
NEUTROPHILS # BLD AUTO: 9.91 X10(3) UL (ref 1.5–7.7)
NEUTROPHILS NFR BLD AUTO: 82.2 %
NITRITE UR QL STRIP.AUTO: NEGATIVE
OSMOLALITY SERPL CALC.SUM OF ELEC: 317 MOSM/KG (ref 275–295)
PH UR STRIP.AUTO: 5.5 [PH] (ref 5–8)
PLATELET # BLD AUTO: 230 10(3)UL (ref 150–450)
POTASSIUM SERPL-SCNC: 4.8 MMOL/L (ref 3.5–5.1)
PROT SERPL-MCNC: 8 G/DL (ref 6.4–8.2)
PROT UR STRIP.AUTO-MCNC: 50 MG/DL
RBC # BLD AUTO: 3.48 X10(6)UL
RSV RNA SPEC NAA+PROBE: NEGATIVE
SARS-COV-2 RNA RESP QL NAA+PROBE: NOT DETECTED
SODIUM SERPL-SCNC: 134 MMOL/L (ref 136–145)
SP GR UR STRIP.AUTO: 1.01 (ref 1–1.03)
UROBILINOGEN UR STRIP.AUTO-MCNC: NORMAL MG/DL
WBC # BLD AUTO: 12.1 X10(3) UL (ref 4–11)

## 2024-04-27 PROCEDURE — 85025 COMPLETE CBC W/AUTO DIFF WBC: CPT | Performed by: EMERGENCY MEDICINE

## 2024-04-27 PROCEDURE — 87040 BLOOD CULTURE FOR BACTERIA: CPT | Performed by: EMERGENCY MEDICINE

## 2024-04-27 PROCEDURE — 76700 US EXAM ABDOM COMPLETE: CPT | Performed by: EMERGENCY MEDICINE

## 2024-04-27 PROCEDURE — 0241U SARS-COV-2/FLU A AND B/RSV BY PCR (GENEXPERT): CPT | Performed by: EMERGENCY MEDICINE

## 2024-04-27 PROCEDURE — 80053 COMPREHEN METABOLIC PANEL: CPT | Performed by: EMERGENCY MEDICINE

## 2024-04-27 PROCEDURE — 36415 COLL VENOUS BLD VENIPUNCTURE: CPT

## 2024-04-27 PROCEDURE — 99285 EMERGENCY DEPT VISIT HI MDM: CPT

## 2024-04-27 PROCEDURE — 81001 URINALYSIS AUTO W/SCOPE: CPT | Performed by: EMERGENCY MEDICINE

## 2024-04-27 PROCEDURE — 71045 X-RAY EXAM CHEST 1 VIEW: CPT | Performed by: EMERGENCY MEDICINE

## 2024-04-27 PROCEDURE — 83690 ASSAY OF LIPASE: CPT | Performed by: EMERGENCY MEDICINE

## 2024-04-27 PROCEDURE — 83605 ASSAY OF LACTIC ACID: CPT | Performed by: EMERGENCY MEDICINE

## 2024-04-27 PROCEDURE — 96360 HYDRATION IV INFUSION INIT: CPT

## 2024-04-27 RX ORDER — ONDANSETRON 4 MG/1
4 TABLET, ORALLY DISINTEGRATING ORAL EVERY 4 HOURS PRN
Qty: 10 TABLET | Refills: 0 | Status: SHIPPED | OUTPATIENT
Start: 2024-04-27 | End: 2024-05-04

## 2024-04-27 NOTE — ED INITIAL ASSESSMENT (HPI)
Intermittent fever and vomiting over the past 3 days, symptoms got worse last night. Poor appetite, constipation, and weakness. Pt denies pain.

## 2024-04-27 NOTE — ED PROVIDER NOTES
Patient Seen in: [facility] Emergency Department      History     Chief Complaint   Patient presents with    Fever    Constipation     Stated Complaint: fever, constipation    Subjective:   HPI    73-year-old with history of chronic kidney disease stage V, coronary artery disease, diabetes, hypertension, high cholesterol, hypothyroidism, BPH presents with his son for evaluation of fever and weakness.  Some of the history is provided by his son.  Patient has not been feeling well for the last 3 days or so.  Has had intermittent fevers, Tmax 101.  Has had nausea and vomiting.  Has been constipated.  Has been weak and unsteady on his feet.  Did fall once but family describes it as \"soft\" and did not result in any injuries.  He denies cough or shortness of breath but does feel fatigued when he tries to walk around.  No abdominal pain.  No flank pain.  No hematuria dysuria urgency or frequency.  Had COVID 3 months ago and was hospitalized at that time, no hospitalizations since.  He receives all of his care in [place], did speak with his PCP who advised that he come to the hospital.  Patient's son notes that he had outpatient routine labs in the last few days and his WBC was 15 K.  Patient's grandchild has had a runny nose and cough recently no other sick contacts.  Not all prior records are available for review.  Admission from 8/2023 reviewed, patient was diagnosed with acute cholecystitis, he declined surgery.  Creatinine at that time was 7.18.  Per his son his creatinine in February was 6.5, in March was about 7.5, and just a few days ago it was 8.5.    Objective:   Past Medical History:    CKD (chronic kidney disease)    Diabetes (HCC)    Essential hypertension    HTN (hypertension)    Renal disorder              History reviewed. No pertinent surgical history.             Social History     Socioeconomic History    Marital status:    Tobacco Use    Smoking status: Never     Passive exposure: Never     Smokeless tobacco: Never   Substance and Sexual Activity    Alcohol use: Never              Review of Systems    Positive for stated complaint: fever, constipation  Other systems are as noted in HPI.  Constitutional and vital signs reviewed.      All other systems reviewed and negative except as noted above.    Physical Exam     ED Triage Vitals [04/27/24 0858]   /64   Pulse 97   Resp 18   Temp 98.7 °F (37.1 °C)   Temp src Temporal   SpO2 98 %   O2 Device None (Room air)       Current:/61   Pulse 55   Temp 98.7 °F (37.1 °C) (Temporal)   Resp 14   Ht 165.1 cm (5' 5\")   Wt 49 kg   SpO2 100%   BMI 17.97 kg/m²         Physical Exam    General: Patient is awake, alert in no acute distress.  He answers questions appropriately.  HEENT:  Sclera are not icteric.  Conjunctivae within normal limits.  Mucous members are mildly dry  Cardiovascular: Regular rate and rhythm, normal S1-S2.  Respiratory: Lungs are clear to auscultation bilaterally.  No wheezes rales or rhonchi.  Abdomen: Soft, nontender, nondistended.  Extremities: No edema.  No unilateral calf swelling or calf tenderness to palpation.  Skin: warm and dry, no diaphoresis    ED Course     Labs Reviewed   COMP METABOLIC PANEL (14) - Abnormal; Notable for the following components:       Result Value    Glucose 187 (*)     Sodium 134 (*)     CO2 20.0 (*)      (*)     Creatinine 8.88 (*)     Calculated Osmolality 317 (*)     eGFR-Cr 6 (*)     Alkaline Phosphatase 119 (*)     Albumin 2.9 (*)     Globulin  5.1 (*)     A/G Ratio 0.6 (*)     All other components within normal limits   URINALYSIS WITH CULTURE REFLEX - Abnormal; Notable for the following components:    Urine Color Colorless (*)     Glucose Urine 50 (*)     Blood Urine Trace (*)     Protein Urine 50 (*)     All other components within normal limits   LIPASE - Abnormal; Notable for the following components:    Lipase 83 (*)     All other components within normal limits   CBC W/  DIFFERENTIAL - Abnormal; Notable for the following components:    WBC 12.1 (*)     RBC 3.48 (*)     HGB 10.4 (*)     HCT 31.3 (*)     Neutrophil Absolute Prelim 9.91 (*)     Neutrophil Absolute 9.91 (*)     Lymphocyte Absolute 0.99 (*)     All other components within normal limits   LACTIC ACID, PLASMA - Normal   SARS-COV-2/FLU A AND B/RSV BY PCR (GENEXPERT) - Normal    Narrative:     This test is intended for the qualitative detection and differentiation of SARS-CoV-2, influenza A, influenza B, and respiratory syncytial virus (RSV) viral RNA in nasopharyngeal or nares swabs from individuals suspected of respiratory viral infection consistent with COVID-19 by their healthcare provider. Signs and symptoms of respiratory viral infection due to SARS-CoV-2, influenza, and RSV can be similar.    Test performed using the Xpert Xpress SARS-CoV-2/FLU/RSV (real time RT-PCR)  assay on the [employer] instrument, [employer], [place] [address].   This test is being used under the Food and Drug Administration's Emergency Use Authorization.    The authorized Fact Sheet for Healthcare Providers for this assay is available upon request from the laboratory.   CBC WITH DIFFERENTIAL WITH PLATELET    Narrative:     The following orders were created for panel order CBC With Differential With Platelet.  Procedure                               Abnormality         Status                     ---------                               -----------         ------                     CBC W/ DIFFERENTIAL[[id number]]          Abnormal            Final result                 Please view results for these tests on the individual orders.   RAINBOW DRAW LAVENDER   RAINBOW DRAW LIGHT GREEN   RAINBOW DRAW BLUE   BLOOD CULTURE   BLOOD CULTURE     Chest x-ray: I personally reviewed the radiographs and my individual interpretation shows no consolidation.  I also reviewed the official report which showed no acute process.  Abdominal ultrasound: Cholelithiasis  without cholecystitis.  Atrophic kidneys.        500 cc normal saline ordered         MDM      History is obtained from: Patient's son    Previous records reviewed as noted in HPI    Differential includes, but is not limited to, UTI, cholecystitis, sepsis, hyperkalemia    Review of any laboratory testing: CBC with WBCs 12.1, hemoglobin 10.4.  CMP with hyperglycemia, normal anion gap.  Elevated BUN and creatinine at 107 and 8.88, CO2 20, potassium normal at 4.8.  Lipase minimally elevated not likely clinically significant.  Urinalysis not suggestive of infection.  COVID flu and RSV negative.  Lactic acid is normal.     Review of any radiographic studies: Chest x-ray, abdominal ultrasound without acute findings    Shared decision making with the patient.  Given patient's reports of a few elevated temps at home and his weakness and vomiting in light of severe CKD I offered admission to the hospital.  However patient is feeling much better, states that he is hungry, and declines admission at this time.  He is well-appearing.  He is able to ambulate independently without difficulty.    Consultants utilized: I spoke with Dr. Lopez from nephrology to arrange for close outpatient follow-up.    Advised patient and his son to return for any new or worsening symptoms such as further fevers, further vomiting, weakness etc.    OTC meds/Rx meds: Zofran as needed                                                        Medical Decision Making      Disposition and Plan     Clinical Impression:  1. Nausea and vomiting, unspecified vomiting type    2. Dehydration         Disposition:  Discharge  4/27/2024  1:20 pm    Follow-up:  Anton Lopez MD  [facility] [address] [place] [id number]  [phone]    Schedule an appointment as soon as possible for a visit in 3 day(s)            Medications Prescribed:  Discharge Medication List as of 4/27/2024  1:21 PM        START taking these medications    Details   ondansetron 4 MG Oral  Tablet Dispersible Take 1 tablet (4 mg total) by mouth every 4 (four) hours as needed for Nausea., Normal, Disp-10 tablet, R-0

## 2024-04-28 ENCOUNTER — APPOINTMENT (OUTPATIENT)
Dept: CT IMAGING | Facility: HOSPITAL | Age: 74
End: 2024-04-28
Attending: EMERGENCY MEDICINE
Payer: MEDICAID

## 2024-04-28 ENCOUNTER — HOSPITAL ENCOUNTER (INPATIENT)
Facility: HOSPITAL | Age: 74
LOS: 5 days | Discharge: HOME | End: 2024-05-04
Attending: EMERGENCY MEDICINE | Admitting: HOSPITALIST
Payer: MEDICAID

## 2024-04-28 ENCOUNTER — HOSPITAL ENCOUNTER (INPATIENT)
Facility: HOSPITAL | Age: 74
LOS: 5 days | Discharge: HOME | DRG: 683 | End: 2024-05-04
Attending: EMERGENCY MEDICINE | Admitting: HOSPITALIST
Payer: MEDICAID

## 2024-04-28 ENCOUNTER — APPOINTMENT (OUTPATIENT)
Dept: CT IMAGING | Facility: HOSPITAL | Age: 74
DRG: 683 | End: 2024-04-28
Attending: EMERGENCY MEDICINE
Payer: MEDICAID

## 2024-04-28 DIAGNOSIS — N19 UREMIA: ICD-10-CM

## 2024-04-28 DIAGNOSIS — R53.1 WEAKNESS GENERALIZED: Primary | ICD-10-CM

## 2024-04-28 DIAGNOSIS — N18.5 ACUTE RENAL FAILURE SUPERIMPOSED ON STAGE 5 CHRONIC KIDNEY DISEASE, NOT ON CHRONIC DIALYSIS, UNSPECIFIED ACUTE RENAL FAILURE TYPE (HCC): ICD-10-CM

## 2024-04-28 DIAGNOSIS — E87.20 METABOLIC ACIDOSIS: ICD-10-CM

## 2024-04-28 DIAGNOSIS — D72.829 LEUKOCYTOSIS, UNSPECIFIED TYPE: ICD-10-CM

## 2024-04-28 DIAGNOSIS — N17.9 ACUTE RENAL FAILURE SUPERIMPOSED ON STAGE 5 CHRONIC KIDNEY DISEASE, NOT ON CHRONIC DIALYSIS, UNSPECIFIED ACUTE RENAL FAILURE TYPE (HCC): ICD-10-CM

## 2024-04-28 DIAGNOSIS — D64.9 ANEMIA, UNSPECIFIED TYPE: ICD-10-CM

## 2024-04-28 LAB
ALBUMIN SERPL-MCNC: 2.8 G/DL (ref 3.4–5)
ALBUMIN/GLOB SERPL: 0.6 {RATIO} (ref 1–2)
ALP LIVER SERPL-CCNC: 134 U/L
ALT SERPL-CCNC: 20 U/L
ANION GAP SERPL CALC-SCNC: 11 MMOL/L (ref 0–18)
AST SERPL-CCNC: 30 U/L (ref 15–37)
BASOPHILS # BLD AUTO: 0.04 X10(3) UL (ref 0–0.2)
BASOPHILS NFR BLD AUTO: 0.2 %
BILIRUB SERPL-MCNC: 0.3 MG/DL (ref 0.1–2)
BUN BLD-MCNC: 97 MG/DL (ref 9–23)
CALCIUM BLD-MCNC: 8.5 MG/DL (ref 8.5–10.1)
CHLORIDE SERPL-SCNC: 106 MMOL/L (ref 98–112)
CO2 SERPL-SCNC: 18 MMOL/L (ref 21–32)
CREAT BLD-MCNC: 8.48 MG/DL
EGFRCR SERPLBLD CKD-EPI 2021: 6 ML/MIN/1.73M2 (ref 60–?)
EOSINOPHIL # BLD AUTO: 0.01 X10(3) UL (ref 0–0.7)
EOSINOPHIL NFR BLD AUTO: 0.1 %
ERYTHROCYTE [DISTWIDTH] IN BLOOD BY AUTOMATED COUNT: 12.4 %
GLOBULIN PLAS-MCNC: 5 G/DL (ref 2.8–4.4)
GLUCOSE BLD-MCNC: 227 MG/DL (ref 70–99)
GLUCOSE BLD-MCNC: 232 MG/DL (ref 70–99)
HCT VFR BLD AUTO: 28.7 %
HGB BLD-MCNC: 9.5 G/DL
IMM GRANULOCYTES # BLD AUTO: 0.28 X10(3) UL (ref 0–1)
IMM GRANULOCYTES NFR BLD: 1.7 %
LIPASE SERPL-CCNC: 97 U/L (ref 13–75)
LYMPHOCYTES # BLD AUTO: 0.83 X10(3) UL (ref 1–4)
LYMPHOCYTES NFR BLD AUTO: 4.9 %
MCH RBC QN AUTO: 29.7 PG (ref 26–34)
MCHC RBC AUTO-ENTMCNC: 33.1 G/DL (ref 31–37)
MCV RBC AUTO: 89.7 FL
MONOCYTES # BLD AUTO: 0.75 X10(3) UL (ref 0.1–1)
MONOCYTES NFR BLD AUTO: 4.5 %
NEUTROPHILS # BLD AUTO: 14.93 X10 (3) UL (ref 1.5–7.7)
NEUTROPHILS # BLD AUTO: 14.93 X10(3) UL (ref 1.5–7.7)
NEUTROPHILS NFR BLD AUTO: 88.6 %
OSMOLALITY SERPL CALC.SUM OF ELEC: 317 MOSM/KG (ref 275–295)
PLATELET # BLD AUTO: 239 10(3)UL (ref 150–450)
POTASSIUM SERPL-SCNC: 5 MMOL/L (ref 3.5–5.1)
PROT SERPL-MCNC: 7.8 G/DL (ref 6.4–8.2)
RBC # BLD AUTO: 3.2 X10(6)UL
SODIUM SERPL-SCNC: 135 MMOL/L (ref 136–145)
WBC # BLD AUTO: 16.8 X10(3) UL (ref 4–11)

## 2024-04-28 PROCEDURE — 74176 CT ABD & PELVIS W/O CONTRAST: CPT | Performed by: EMERGENCY MEDICINE

## 2024-04-28 PROCEDURE — 99223 1ST HOSP IP/OBS HIGH 75: CPT | Performed by: HOSPITALIST

## 2024-04-28 RX ORDER — ONDANSETRON 2 MG/ML
4 INJECTION INTRAMUSCULAR; INTRAVENOUS ONCE
Status: COMPLETED | OUTPATIENT
Start: 2024-04-28 | End: 2024-04-28

## 2024-04-29 ENCOUNTER — APPOINTMENT (OUTPATIENT)
Dept: GENERAL RADIOLOGY | Facility: HOSPITAL | Age: 74
End: 2024-04-29
Attending: HOSPITALIST
Payer: MEDICAID

## 2024-04-29 ENCOUNTER — APPOINTMENT (OUTPATIENT)
Dept: GENERAL RADIOLOGY | Facility: HOSPITAL | Age: 74
DRG: 683 | End: 2024-04-29
Attending: HOSPITALIST
Payer: MEDICAID

## 2024-04-29 PROBLEM — N18.5 ACUTE RENAL FAILURE SUPERIMPOSED ON STAGE 5 CHRONIC KIDNEY DISEASE, NOT ON CHRONIC DIALYSIS, UNSPECIFIED ACUTE RENAL FAILURE TYPE (HCC): Status: ACTIVE | Noted: 2024-04-29

## 2024-04-29 PROBLEM — D72.829 LEUKOCYTOSIS, UNSPECIFIED TYPE: Status: ACTIVE | Noted: 2024-04-29

## 2024-04-29 PROBLEM — N19 UREMIA: Status: ACTIVE | Noted: 2024-04-29

## 2024-04-29 PROBLEM — N17.9 ACUTE RENAL FAILURE SUPERIMPOSED ON STAGE 5 CHRONIC KIDNEY DISEASE, NOT ON CHRONIC DIALYSIS, UNSPECIFIED ACUTE RENAL FAILURE TYPE (HCC): Status: ACTIVE | Noted: 2024-04-29

## 2024-04-29 PROBLEM — E87.20 METABOLIC ACIDOSIS: Status: ACTIVE | Noted: 2024-04-29

## 2024-04-29 PROBLEM — D64.9 ANEMIA, UNSPECIFIED TYPE: Status: ACTIVE | Noted: 2024-04-29

## 2024-04-29 LAB
ADENOVIRUS PCR:: NOT DETECTED
ALBUMIN SERPL-MCNC: 2.2 G/DL (ref 3.4–5)
ALBUMIN/GLOB SERPL: 0.6 {RATIO} (ref 1–2)
ALP LIVER SERPL-CCNC: 91 U/L
ALT SERPL-CCNC: 16 U/L
ANION GAP SERPL CALC-SCNC: 7 MMOL/L (ref 0–18)
AST SERPL-CCNC: 12 U/L (ref 15–37)
B PARAPERT DNA SPEC QL NAA+PROBE: NOT DETECTED
B PERT DNA SPEC QL NAA+PROBE: NOT DETECTED
BASOPHILS # BLD AUTO: 0.02 X10(3) UL (ref 0–0.2)
BASOPHILS NFR BLD AUTO: 0.1 %
BILIRUB SERPL-MCNC: 0.3 MG/DL (ref 0.1–2)
BILIRUB UR QL STRIP.AUTO: NEGATIVE
BUN BLD-MCNC: 90 MG/DL (ref 9–23)
C PNEUM DNA SPEC QL NAA+PROBE: NOT DETECTED
CALCIUM BLD-MCNC: 7.3 MG/DL (ref 8.5–10.1)
CHLORIDE SERPL-SCNC: 114 MMOL/L (ref 98–112)
CLARITY UR REFRACT.AUTO: CLEAR
CO2 SERPL-SCNC: 18 MMOL/L (ref 21–32)
COLOR UR AUTO: COLORLESS
CORONAVIRUS 229E PCR:: NOT DETECTED
CORONAVIRUS HKU1 PCR:: NOT DETECTED
CORONAVIRUS NL63 PCR:: NOT DETECTED
CORONAVIRUS OC43 PCR:: NOT DETECTED
CREAT BLD-MCNC: 8.04 MG/DL
CREAT UR-SCNC: 64.1 MG/DL
DEPRECATED HBV CORE AB SER IA-ACNC: 257.8 NG/ML
EGFRCR SERPLBLD CKD-EPI 2021: 6 ML/MIN/1.73M2 (ref 60–?)
EOSINOPHIL # BLD AUTO: 0 X10(3) UL (ref 0–0.7)
EOSINOPHIL NFR BLD AUTO: 0 %
ERYTHROCYTE [DISTWIDTH] IN BLOOD BY AUTOMATED COUNT: 12.4 %
EST. AVERAGE GLUCOSE BLD GHB EST-MCNC: 189 MG/DL (ref 68–126)
FLUAV RNA SPEC QL NAA+PROBE: NOT DETECTED
FLUBV RNA SPEC QL NAA+PROBE: NOT DETECTED
GLOBULIN PLAS-MCNC: 3.9 G/DL (ref 2.8–4.4)
GLUCOSE BLD-MCNC: 118 MG/DL (ref 70–99)
GLUCOSE BLD-MCNC: 122 MG/DL (ref 70–99)
GLUCOSE BLD-MCNC: 133 MG/DL (ref 70–99)
GLUCOSE BLD-MCNC: 214 MG/DL (ref 70–99)
GLUCOSE BLD-MCNC: 302 MG/DL (ref 70–99)
GLUCOSE UR STRIP.AUTO-MCNC: NORMAL MG/DL
HBA1C MFR BLD: 8.2 % (ref ?–5.7)
HCT VFR BLD AUTO: 23.5 %
HGB BLD-MCNC: 7.4 G/DL
IMM GRANULOCYTES # BLD AUTO: 0.2 X10(3) UL (ref 0–1)
IMM GRANULOCYTES NFR BLD: 1.1 %
IRON SATN MFR SERPL: 17 %
IRON SERPL-MCNC: 26 UG/DL
KETONES UR STRIP.AUTO-MCNC: NEGATIVE MG/DL
LACTATE SERPL-SCNC: 1.2 MMOL/L (ref 0.4–2)
LEUKOCYTE ESTERASE UR QL STRIP.AUTO: NEGATIVE
LYMPHOCYTES # BLD AUTO: 1.72 X10(3) UL (ref 1–4)
LYMPHOCYTES NFR BLD AUTO: 9.3 %
MAGNESIUM SERPL-MCNC: 2.4 MG/DL (ref 1.6–2.6)
MCH RBC QN AUTO: 29.7 PG (ref 26–34)
MCHC RBC AUTO-ENTMCNC: 31.5 G/DL (ref 31–37)
MCV RBC AUTO: 94.4 FL
METAPNEUMOVIRUS PCR:: NOT DETECTED
MONOCYTES # BLD AUTO: 1.19 X10(3) UL (ref 0.1–1)
MONOCYTES NFR BLD AUTO: 6.4 %
MYCOPLASMA PNEUMONIA PCR:: NOT DETECTED
NEUTROPHILS # BLD AUTO: 15.43 X10 (3) UL (ref 1.5–7.7)
NEUTROPHILS # BLD AUTO: 15.43 X10(3) UL (ref 1.5–7.7)
NEUTROPHILS NFR BLD AUTO: 83.1 %
NITRITE UR QL STRIP.AUTO: NEGATIVE
OSMOLALITY SERPL CALC.SUM OF ELEC: 317 MOSM/KG (ref 275–295)
PARAINFLUENZA 1 PCR:: NOT DETECTED
PARAINFLUENZA 2 PCR:: NOT DETECTED
PARAINFLUENZA 3 PCR:: NOT DETECTED
PARAINFLUENZA 4 PCR:: NOT DETECTED
PH UR STRIP.AUTO: 5.5 [PH] (ref 5–8)
PLATELET # BLD AUTO: 195 10(3)UL (ref 150–450)
POTASSIUM SERPL-SCNC: 4.9 MMOL/L (ref 3.5–5.1)
PROT SERPL-MCNC: 6.1 G/DL (ref 6.4–8.2)
PROT UR STRIP.AUTO-MCNC: 50 MG/DL
PROT UR-MCNC: 87.5 MG/DL
PROT/CREAT UR-RTO: 1.37
RBC # BLD AUTO: 2.49 X10(6)UL
RBC UR QL AUTO: NEGATIVE
RHINOVIRUS/ENTERO PCR:: NOT DETECTED
RSV RNA SPEC QL NAA+PROBE: NOT DETECTED
SARS-COV-2 RNA NPH QL NAA+NON-PROBE: NOT DETECTED
SODIUM SERPL-SCNC: 139 MMOL/L (ref 136–145)
SODIUM SERPL-SCNC: 63 MMOL/L
SP GR UR STRIP.AUTO: 1.01 (ref 1–1.03)
TIBC SERPL-MCNC: 149 UG/DL (ref 240–450)
TRANSFERRIN SERPL-MCNC: 100 MG/DL (ref 200–360)
UROBILINOGEN UR STRIP.AUTO-MCNC: NORMAL MG/DL
WBC # BLD AUTO: 18.6 X10(3) UL (ref 4–11)

## 2024-04-29 PROCEDURE — 71046 X-RAY EXAM CHEST 2 VIEWS: CPT | Performed by: HOSPITALIST

## 2024-04-29 PROCEDURE — 99223 1ST HOSP IP/OBS HIGH 75: CPT | Performed by: INTERNAL MEDICINE

## 2024-04-29 PROCEDURE — 99232 SBSQ HOSP IP/OBS MODERATE 35: CPT | Performed by: HOSPITALIST

## 2024-04-29 RX ORDER — SENNOSIDES 8.6 MG
17.2 TABLET ORAL NIGHTLY PRN
Status: DISCONTINUED | OUTPATIENT
Start: 2024-04-29 | End: 2024-05-04

## 2024-04-29 RX ORDER — LEVOTHYROXINE SODIUM 0.07 MG/1
75 TABLET ORAL
Status: DISCONTINUED | OUTPATIENT
Start: 2024-04-29 | End: 2024-05-04

## 2024-04-29 RX ORDER — ACETAMINOPHEN 500 MG
500 TABLET ORAL EVERY 4 HOURS PRN
Status: DISCONTINUED | OUTPATIENT
Start: 2024-04-29 | End: 2024-05-04

## 2024-04-29 RX ORDER — SODIUM CHLORIDE 9 MG/ML
INJECTION, SOLUTION INTRAVENOUS CONTINUOUS
Status: DISCONTINUED | OUTPATIENT
Start: 2024-04-29 | End: 2024-04-29

## 2024-04-29 RX ORDER — FOLIC ACID 1 MG/1
1 TABLET ORAL DAILY
Status: DISCONTINUED | OUTPATIENT
Start: 2024-04-29 | End: 2024-05-04

## 2024-04-29 RX ORDER — ROSUVASTATIN CALCIUM 5 MG/1
10 TABLET, COATED ORAL DAILY
Status: DISCONTINUED | OUTPATIENT
Start: 2024-04-29 | End: 2024-05-04

## 2024-04-29 RX ORDER — HEPARIN SODIUM 5000 [USP'U]/ML
5000 INJECTION, SOLUTION INTRAVENOUS; SUBCUTANEOUS EVERY 12 HOURS SCHEDULED
Status: DISCONTINUED | OUTPATIENT
Start: 2024-04-29 | End: 2024-05-04

## 2024-04-29 RX ORDER — ONDANSETRON 2 MG/ML
4 INJECTION INTRAMUSCULAR; INTRAVENOUS EVERY 6 HOURS PRN
Status: DISCONTINUED | OUTPATIENT
Start: 2024-04-29 | End: 2024-05-04

## 2024-04-29 RX ORDER — NICOTINE POLACRILEX 4 MG
15 LOZENGE BUCCAL
Status: DISCONTINUED | OUTPATIENT
Start: 2024-04-29 | End: 2024-05-04

## 2024-04-29 RX ORDER — BISACODYL 10 MG
10 SUPPOSITORY, RECTAL RECTAL
Status: DISCONTINUED | OUTPATIENT
Start: 2024-04-29 | End: 2024-05-04

## 2024-04-29 RX ORDER — MELATONIN
3 NIGHTLY PRN
Status: DISCONTINUED | OUTPATIENT
Start: 2024-04-29 | End: 2024-05-04

## 2024-04-29 RX ORDER — DEXTROSE MONOHYDRATE 25 G/50ML
50 INJECTION, SOLUTION INTRAVENOUS
Status: DISCONTINUED | OUTPATIENT
Start: 2024-04-29 | End: 2024-05-04

## 2024-04-29 RX ORDER — TAMSULOSIN HYDROCHLORIDE 0.4 MG/1
0.4 CAPSULE ORAL DAILY
Status: DISCONTINUED | OUTPATIENT
Start: 2024-04-29 | End: 2024-05-04

## 2024-04-29 RX ORDER — METOCLOPRAMIDE HYDROCHLORIDE 5 MG/ML
5 INJECTION INTRAMUSCULAR; INTRAVENOUS EVERY 8 HOURS PRN
Status: DISCONTINUED | OUTPATIENT
Start: 2024-04-29 | End: 2024-05-04

## 2024-04-29 RX ORDER — NICOTINE POLACRILEX 4 MG
30 LOZENGE BUCCAL
Status: DISCONTINUED | OUTPATIENT
Start: 2024-04-29 | End: 2024-05-04

## 2024-04-29 RX ORDER — POLYETHYLENE GLYCOL 3350 17 G/17G
17 POWDER, FOR SOLUTION ORAL DAILY PRN
Status: DISCONTINUED | OUTPATIENT
Start: 2024-04-29 | End: 2024-05-04

## 2024-04-29 NOTE — ED INITIAL ASSESSMENT (HPI)
Pt to ER with c/o vomiting that started yesterday. Pt was seen yesterday for the same reason.  Pt was also constipated yesterday. Pt had a small BM today.  Hx of CKD.   Pt appears very weak, c/o generalized pain.       During triage, pt vomited coffee ground emesis.

## 2024-04-29 NOTE — PLAN OF CARE
Patient is alert, Marathi speaking. Son at bedside assists with translation per patient and family request. Patient denies pain. IVF per MAR. Temperature 101.9 - PRN per MAR, MD Juma notified, orders received. Bed alarm on, call light within reach.

## 2024-04-29 NOTE — ED QUICK NOTES
Orders for admission, patient is aware of plan and ready to go upstairs. Any questions, please call ED RN Maria Ines at extension [phone].     Patient Covid vaccination status: Unvaccinated     COVID Test Ordered in ED: None    COVID Suspicion at Admission: N/A    Running Infusions:      Mental Status/LOC at time of transport: AO x 3    Other pertinent information: Marathi speaking  CIWA score: N/A   NIH score:  N/A

## 2024-04-29 NOTE — PROGRESS NOTES
[facility]   part of [facility]     Hospitalist Progress Note     Elliott Enamorado Patient Status:  Inpatient    1950 MRN [id number]   Location [facility] 4NW-A Attending Parag Dennison MD   Hosp Day # 0 PCP No primary care provider on file.     Chief Complaint: Nausea and vomiting    Subjective:     Patient is feeling better.  Denies further n/v, no fever/chills.  No diarrhea.  No other acute complaints.  Son present at bedside.      Objective:    Review of Systems:   A comprehensive review of systems was completed; pertinent positive and negatives stated in subjective.    Vital signs:  Temp:  [98.1 °F (36.7 °C)-100 °F (37.8 °C)] 98.1 °F (36.7 °C)  Pulse:  [59-99] 69  Resp:  [18-24] 18  BP: (117-167)/(46-71) 146/46  SpO2:  [99 %-100 %] 100 %    Physical Exam:    General: No acute distress, pleasant   Respiratory: No wheezes, no rhonchi  Cardiovascular: S1, S2, regular rate and rhythm  Abdomen: Soft, Non-tender, non-distended, positive bowel sounds  Neuro: No new focal deficits.   Extremities: No edema    Diagnostic Data:    Labs:  Recent Labs   Lab 24  0924  0616   WBC 12.1* 16.8* 18.6*   HGB 10.4* 9.5* 7.4*   MCV 89.9 89.7 94.4   .0 239.0 195.0       Recent Labs   Lab 24  0616   * 227* 118*   * 97* 90*   CREATSERUM 8.88* 8.48* 8.04*   CA 8.5 8.5 7.3*   ALB 2.9* 2.8* 2.2*   * 135* 139   K 4.8 5.0 4.9    106 114*   CO2 20.0* 18.0* 18.0*   ALKPHO 119* 134* 91   AST 20 30 12*   ALT 17 20 16   BILT 0.3 0.3 0.3   TP 8.0 7.8 6.1*       Estimated Creatinine Clearance: 5.5 mL/min (A) (based on SCr of 8.04 mg/dL (H)).    No results for input(s): \"TROP\", \"TROPHS\", \"CK\" in the last 168 hours.    No results for input(s): \"PTP\", \"INR\" in the last 168 hours.               Microbiology    No results found for this visit on 24.      Imaging: Reviewed in Epic.    Medications:    insulin aspart  1-10 Units  Subcutaneous TID AC and HS    heparin  5,000 Units Subcutaneous 2 times per day    folic acid  1 mg Oral Daily    levothyroxine  75 mcg Oral Before breakfast    rosuvastatin  10 mg Oral Daily    tamsulosin  0.4 mg Oral Daily       Assessment & Plan:      #Acute on CKD stage V  #Nonanion gap metabolic acidosis  #Hyponatremia  #Hyperkalemia  #Uremia   Nephrology consult appreciated  Patient/family not wanting HD cath today or HD  Fluids for volume resuscitation   Trend labs    #Choledocholithiasis?  Noted on CT - 2mm CBD stones  Patient without RUQ abdominal pain     #Leukocytosis  #Viral gastroenteritis   Afebrile this morning  CT abdomen pelvis neg  Recent UA unremarkable  Recent blood cultures NGTD     #CAD s/p CABG     #Hyperlipidemia      #Diabetes mellitus type 2  -A1c 7.0 as of August 2023  -Repeat A1c  -Insulin sliding scale for now     #BPH - flomax     #Hypothyroidism - synthroid      Parag Dennison MD    Supplementary Documentation:     Quality:  DVT Mechanical Prophylaxis:   SCDs,    DVT Pharmacologic Prophylaxis   Medication    heparin (Porcine) 5000 UNIT/ML injection 5,000 Units                Code Status: Not on file  [place]: No urinary catheter in place  [place]ahan Duration (in days):   Central line:    AUREA:     Discharge is dependent on: clinical status   At this point Mr. Enamorado is expected to be discharge to: Home    The 21st Century Cures Act makes medical notes like these available to patients in the interest of transparency. Please be advised this is a medical document. Medical documents are intended to carry relevant information, facts as evident, and the clinical opinion of the practitioner. The medical note is intended as peer to peer communication and may appear blunt or direct. It is written in medical language and may contain abbreviations or verbiage that are unfamiliar.

## 2024-04-29 NOTE — CONSULTS
[facility]  Report of Consultation    Elliott Enamorado Patient Status:  Inpatient    1950 MRN [id number]   Location [facility] 4NW-A Attending Parag Dennison MD   Hosp Day # 0 PCP No primary care provider on file.     Reason for Consultation:  BIJU/Stg V CKD- not on dialys     History of Present Illness:  Elliott Enamorado is a a(n) 73 year old male with hx of DM, HTN, CKD V presents to hospital for evaluation of nausea/vomiting/fever  Patient was under care of a nehrologist in [place] (Dr. Oliveira).  He has been recommended dialysis, but patient has opted to do more conservative measures. Denies any cp/sob  No n/v  High fever @ home per the son  Son reports Cr ~ 4 in [place], but our lab records show Cr ~ 6 even in ; now up to ~ 8      History:  Past Medical History:    CKD (chronic kidney disease)    Diabetes (HCC)    Essential hypertension    HTN (hypertension)    Renal disorder     History reviewed. No pertinent surgical history.  No family history on file.   reports that he has never smoked. He has never been exposed to tobacco smoke. He has never used smokeless tobacco. He reports that he does not drink alcohol and does not use drugs.    Allergies:  Allergies   Allergen Reactions    Ibuprofen RASH    Sulfa Antibiotics RASH       Current Medications:    Current Facility-Administered Medications:     glucose (Dex4) 15 GM/59ML oral liquid 15 g, 15 g, Oral, Q15 Min PRN **OR** glucose (Glutose) 40% oral gel 15 g, 15 g, Oral, Q15 Min PRN **OR** glucose-vitamin C (Dex-4) chewable tab 4 tablet, 4 tablet, Oral, Q15 Min PRN **OR** dextrose 50% injection 50 mL, 50 mL, Intravenous, Q15 Min PRN **OR** glucose (Dex4) 15 GM/59ML oral liquid 30 g, 30 g, Oral, Q15 Min PRN **OR** glucose (Glutose) 40% oral gel 30 g, 30 g, Oral, Q15 Min PRN **OR** glucose-vitamin C (Dex-4) chewable tab 8 tablet, 8 tablet, Oral, Q15 Min PRN    insulin aspart (NovoLOG) 100 Units/mL FlexPen 1-10 Units, 1-10 Units,  Subcutaneous, TID AC and HS    acetaminophen (Tylenol Extra Strength) tab 500 mg, 500 mg, Oral, Q4H PRN    melatonin tab 3 mg, 3 mg, Oral, Nightly PRN    polyethylene glycol (PEG 3350) (Miralax) 17 g oral packet 17 g, 17 g, Oral, Daily PRN    sennosides (Senokot) tab 17.2 mg, 17.2 mg, Oral, Nightly PRN    bisacodyl (Dulcolax) 10 MG rectal suppository 10 mg, 10 mg, Rectal, Daily PRN    ondansetron (Zofran) 4 MG/2ML injection 4 mg, 4 mg, Intravenous, Q6H PRN    metoclopramide (Reglan) 5 mg/mL injection 5 mg, 5 mg, Intravenous, Q8H PRN    heparin (Porcine) 5000 UNIT/ML injection 5,000 Units, 5,000 Units, Subcutaneous, 2 times per day    folic acid (Folvite) tab 1 mg, 1 mg, Oral, Daily    levothyroxine (Synthroid) tab 75 mcg, 75 mcg, Oral, Before breakfast    rosuvastatin (Crestor) tab 10 mg, 10 mg, Oral, Daily    tamsulosin (Flomax) cap 0.4 mg, 0.4 mg, Oral, Daily    sodium bicarbonate 75 mEq in dextrose 5%-sodium chloride 0.45% 1,075 mL infusion, 75 mEq, Intravenous, Continuous  Home Medications:  No current outpatient medications on file.       Review of Systems:  See HPI; A total of 12 systems reviewed and otherwise unremarkable.    Physical Exam:  Vital signs: Blood pressure 146/46, pulse 69, temperature 98.5 °F (36.9 °C), temperature source Oral, resp. rate 18, height 5' 5\" (1.651 m), weight 107 lb (48.5 kg), SpO2 100%.  General: No acute distress. Alert and oriented x 3  HEENT: Moist mucous membranes. EOM-I. PERRL  Neck: No lymphadenopathy.  No JVD. No carotid bruits.  Respiratory: Clear to auscultation bilaterally.  No wheezes. No rhonchi.  Cardiovascular: S1, S2.  Regular rate and rhythm.  No murmurs. Equal pulses   Abdomen: Soft, nontender, nondistended.  Positive bowel sounds. No rebound tenderness   Neurologic: No focal neurological deficits.   Musculoskeletal: Full range of motion of all extremities.  No swelling noted.   Integument: No lesions. No erythema.  Psychiatric: Appropriate mood and  affect.    Laboratory:  Lab Results   Component Value Date    WBC 18.6 04/29/2024    HGB 7.4 04/29/2024    HCT 23.5 04/29/2024    .0 04/29/2024    CREATSERUM 8.04 04/29/2024    BUN 90 04/29/2024     04/29/2024    K 4.9 04/29/2024     04/29/2024    CO2 18.0 04/29/2024     04/29/2024    CA 7.3 04/29/2024    ALB 2.2 04/29/2024    ALKPHO 91 04/29/2024    BILT 0.3 04/29/2024    TP 6.1 04/29/2024    AST 12 04/29/2024    ALT 16 04/29/2024    LIP 97 04/28/2024    MG 2.4 04/29/2024    PGLU 133 04/29/2024          BUN (mg/dL)   Date Value   04/29/2024 90 (H)   04/28/2024 97 (H)   04/27/2024 107 (H)     Creatinine (mg/dL)   Date Value   04/29/2024 8.04 (H)   04/28/2024 8.48 (H)   04/27/2024 8.88 (H)         Imaging:  Reviewed     Impression:  BIJU/CKD- stg V; related to DM per history. Acute component maybe related to acute febrile illness. He is mildly volume depleted.   He has uremic symptom  - recommended starting HD ,but pt/son would like to see how he does w/ medical management first  - continue fluids  - check urine chem  - monitor labs  Acidosis- related to # 1; fluids adjusted to give bicarb  Hyperkalemia ; improved  N/v- likley component of gastroenteritis + uremia- symptomatic care  Anemia due to chronic disease/ckd- check iron stores  DM        Thank you for allowing me to participate in this patient's care.  Please feel free to call me with any questions or concerns.    Anton Lopez MD  4/29/2024  10:39 AM

## 2024-04-29 NOTE — PHYSICAL THERAPY NOTE
PHYSICAL THERAPY EVALUATION - INPATIENT     Room Number: 405/405-A  Evaluation Date: 4/29/2024  Type of Evaluation: Initial  Physician Order: PT Eval and Treat    Presenting Problem: Generalized weakness and metabolic acidosis  Co-Morbidities : CKD5, HLD, DM2, CAD s/p CABG, hypothyroidism, BPH  Reason for Therapy: Mobility Dysfunction and Discharge Planning    CT of abdomen/pelvis 4/28/24-CONCLUSION:       1. The appendix is normal.  No evidence of an acute inflammatory process.      2. No calculi in the kidneys.  No hydronephrosis.       3. There is a new tiny 2 mm calcification in the very distal common bile duct.  This may represent choledocholithiasis.  However, there is no significant biliary ductal dilatation.  MRCP may be done for further evaluation if needed.    US of abdomen complete 4/27/24-Impression  CONCLUSION:    1. Cholelithiasis without secondary evidence of acute cholecystitis.   2. Findings suggestive of possible mild hepatic steatosis.   3. Bilateral mild 8 trophic kidneys with echogenic parenchyma, which can be seen in the setting of medical renal disease, correlate clinically.    PHYSICAL THERAPY ASSESSMENT   Patient is a 73 year old male admitted 4/28/2024 for Generalized weakness and metabolic acidosis.   Patient is currently functioning at baseline with bed mobility, transfers, gait, and standing prolonged periods. Prior to admission, patient's baseline is indep c ADLs and amb. Lives c his son and DIL, intermittently when he feels unwell will require some assistance from them, however typically does not.     Patient will benefit from continued amb and t/f For duration of hospitalization, however, given the patient is functioning near baseline level do not anticipate skilled therapy needs at discharge .    PLAN  Patient has been evaluated and presents with no skilled Physical Therapy needs at this time.  Patient discharged from Physical Therapy services.  Please re-order if a new  functional limitation presents during this admission.    GOALS  Patient was able to achieve the following goals ...    Patient was able to transfer At previous, functional level   Patient able to ambulate on level surfaces At previous, functional level         HOME SITUATION  Type of Home: House   Home Layout: One level  Stairs to Enter : 2  Railing: No          Lives With: Family;Son (SHANTE)  Drives: No  Patient Owned Equipment: Other (Comment) (shower stool)  Patient Regularly Uses: Reading glasses    Prior Level of [place]: Indep c ADLs and amb; will assist intermittently c IADLs, however son and DIL primarily perform them; family is typically home c him throughout the day if he requires assistance    SUBJECTIVE  \"I'm thirsty\"-per son intepreting      OBJECTIVE  Precautions:  needed;Other (Comment) (jc Hunter)  Fall Risk: High fall risk    WEIGHT BEARING RESTRICTION  Weight Bearing Restriction: None                PAIN ASSESSMENT  Rating: Unable to rate  Location: L great toe and lateral border of his foot  Management Techniques: Activity promotion;Body mechanics;Repositioning;Other (Comment) (gentle AROM as tolerated-wear shoes while standing and amb)    COGNITION  Overall Cognitive Status:  WFL - within functional limits  Arousal/Alertness:  appropriate responses to stimuli  Orientation Level:  oriented x4  Following Commands:  follows all commands and directions without difficulty  Safety Judgement:  good awareness of safety precautions    RANGE OF MOTION AND STRENGTH ASSESSMENT  Upper extremity ROM and strength are within functional limits     Lower extremity ROM is within functional limits     Lower extremity strength is within functional limits       BALANCE  Static Sitting: Good  Dynamic Sitting: Good  Static Standing: Fair +  Dynamic Standing: Fair    ADDITIONAL TESTS                                    ACTIVITY TOLERANCE                         O2 WALK       NEUROLOGICAL  FINDINGS  Neurological Findings: Sensation           Sensation: BLE symmetrical/intact to light touch         AM-PAC '6-Clicks' INPATIENT SHORT FORM - BASIC MOBILITY  How much difficulty does the patient currently have...  Patient Difficulty: Turning over in bed (including adjusting bedclothes, sheets and blankets)?: None   Patient Difficulty: Sitting down on and standing up from a chair with arms (e.g., wheelchair, bedside commode, etc.): None   Patient Difficulty: Moving from lying on back to sitting on the side of the bed?: None   How much help from another person does the patient currently need...   Help from Another: Moving to and from a bed to a chair (including a wheelchair)?: None   Help from Another: Need to walk in hospital room?: A Little   Help from Another: Climbing 3-5 steps with a railing?: A Little       AM-PAC Score:  Raw Score: 22   Approx Degree of Impairment: 20.91%   Standardized Score (AM-PAC Scale): 53.28   CMS Modifier (G-Code): CJ    FUNCTIONAL ABILITY STATUS  Gait Assessment   Functional Mobility/Gait Assessment  Gait Assistance: Supervision  Distance (ft): 100  Assistive Device: None  Pattern: L Decreased stance time;Comment (mild antalgia, favoring L foot)    Skilled Therapy Provided     Bed Mobility:  Rolling: indep  Supine to sit: mod indep   Sit to supine: mod indep     Transfer Mobility:  Sit to stand: mod indep   Stand to sit: mod indep  Gait = supervision x100' no device    Therapist's comments:Pt presents to PT lying supine in bed c son present. Pt is eager to participate. He is able to complete rolling indep and t/f to the EOB c mod indep. Pt is able to flex LE and fix his sock position while sitting at the EOB c no LOB. Sit/stand c mod indep, no device. He is then able to amb 100' c supervision, however favoring LLE/foot reporting discomfort in great toe and lateral border which dissipates once he is back in his room. Following this, pt t/f back to bed supine c mod indep. RN  aware of session.     Exercise/Education Provided:  Bed mobility  Body mechanics  Functional activity tolerated  Gait training  Posture  Transfer training    Patient End of Session: In bed;Needs met;Call light within reach;RN aware of session/findings;All patient questions and concerns addressed;Family present    Patient Evaluation Complexity Level:  History Moderate - 1 or 2 personal factors and/or co-morbidities   Examination of body systems Low - addressing 1-2 elements   Clinical Presentation Low - Stable   Clinical Decision Making Low Complexity       PT Session Time: 30 minutes  Gait Trainin minutes  Therapeutic Activity: 8 minutes

## 2024-04-29 NOTE — ED INITIAL ASSESSMENT (HPI)
Pt to ER with c/o vomiting that started yesterday. Pt was seen yesterday for the same reason.  Pt was also constipated yesterday. Pt had a small BM today.  Hx of CKD.   Pt appears very weak, c/o generalized pain.

## 2024-04-29 NOTE — H&P
[facility]     Elliott Enamorado Patient Status:  Emergency    1950 MRN [id number]   Location [facility] EMERGENCY DEPARTMENT Attending Jorge Mckeon MD   Hosp Day # 0 PCP No primary care provider on file.     Chief Complaint: \"I cannot stop vomiting\"    Subjective:    History of Present Illness:     Elliott Enamorado is a 73 year old male with past medical history CKD stage V, hyperlipidemia, diabetes mellitus type 2, CAD, BPH, hypothyroidism presents to hospital today for intractable nausea and vomiting.  Patient actually came into the emergency room yesterday for same evaluation.  He now is feeling more weak and tired.  He thought initially was constipated but had a small bowel movement.  Patient otherwise denies any abdominal pain, chest pain, short of breath, cough, dysuria, urgency, frequency. Of note, he is from [place] but moved here locally recently to be close w/ family.  Pt declined admission to the hospital yesterday.     -S/p IV Zofran, IV fluid bolus, nephrology consult in the emergency room    History/Other:    Past Medical History:  Past Medical History:    CKD (chronic kidney disease)    Diabetes (HCC)    Essential hypertension    HTN (hypertension)    Renal disorder     Past Surgical History:   History reviewed. No pertinent surgical history.   Family History:   No family history on file.  Social History:    reports that he has never smoked. He has never been exposed to tobacco smoke. He has never used smokeless tobacco. He reports that he does not drink alcohol and does not use drugs.     Allergies:   Allergies   Allergen Reactions    Ibuprofen RASH    Sulfa Antibiotics RASH       Medications:    Current Facility-Administered Medications on File Prior to Encounter   Medication Dose Route Frequency Provider Last Rate Last Admin    [COMPLETED] sodium chloride 0.9 % IV bolus 500 mL  500 mL Intravenous Once Callie Carey MD   Stopped at 24  1031     Current Outpatient Medications on File Prior to Encounter   Medication Sig Dispense Refill    ondansetron 4 MG Oral Tablet Dispersible Take 1 tablet (4 mg total) by mouth every 4 (four) hours as needed for Nausea. 10 tablet 0    febuxostat 40 MG Oral Tab Take 1 tablet (40 mg total) by mouth daily.      levothyroxine 75 MCG Oral Tab Take 1 tablet (75 mcg total) by mouth before breakfast.      amoxicillin clavulanate 250-125 MG Oral Tab Take 1 tablet (250 mg total) by mouth every 12 (twelve) hours. 10 tablet 0    ondansetron (ZOFRAN) 4 mg tablet Take 1 tablet (4 mg total) by mouth every 8 (eight) hours as needed for Nausea. 15 tablet 0    tamsulosin 0.4 MG Oral Cap Take 1 capsule (0.4 mg total) by mouth daily.      torsemide 10 MG Oral Tab Take 1 tablet (10 mg total) by mouth daily.      Repaglinide 0.5 MG Oral Tab Take 1 tablet (0.5 mg total) by mouth 3 (three) times daily before meals.      folic acid 1 MG Oral Tab Take 1 tablet (1 mg total) by mouth daily.      ASPIRIN ADULT OR Take 75 mg by mouth daily.      rosuvastatin 10 MG Oral Tab Take 1 tablet (10 mg total) by mouth daily.         Review of Systems:   A comprehensive review of systems was completed.    Pertinent positives and negatives noted in the HPI.    Objective:   Physical Exam:    /71   Pulse 90   Temp 99.6 °F (37.6 °C) (Temporal)   Resp 22   Ht 5' 5\" (1.651 m)   Wt 112 lb (50.8 kg)   SpO2 99%   BMI 18.64 kg/m²   General: No acute distress, Alert  Respiratory: No rhonchi, no wheezes  Cardiovascular: S1, S2. Regular rate and rhythm  Abdomen: Soft, Non-tender, non-distended, positive bowel sounds  Neuro: No new focal deficits  Extremities: No edema      Results:    Labs:      Labs Last 24 Hours:    Recent Labs   Lab 04/27/24  0930 04/28/24 2054   RBC 3.48* 3.20*   HGB 10.4* 9.5*   HCT 31.3* 28.7*   MCV 89.9 89.7   MCH 29.9 29.7   MCHC 33.2 33.1   RDW 12.5 12.4   NEPRELIM 9.91* 14.93*   WBC 12.1* 16.8*   .0 239.0       Recent  Labs   Lab 04/27/24  0930 04/28/24 2054   * 227*   * 97*   CREATSERUM 8.88* 8.48*   EGFRCR 6* 6*   CA 8.5 8.5   ALB 2.9* 2.8*   * 135*   K 4.8 5.0    106   CO2 20.0* 18.0*   ALKPHO 119* 134*   AST 20 30   ALT 17 20   BILT 0.3 0.3   TP 8.0 7.8       No results found for: \"PT\", \"INR\"    No results for input(s): \"TROP\", \"TROPHS\", \"CK\" in the last 168 hours.    No results for input(s): \"TROP\", \"PBNP\" in the last 168 hours.    No results for input(s): \"PCT\" in the last 168 hours.    Imaging: Imaging data reviewed in Epic.    Assessment & Plan:        #Acute on CKD stage V  #Nonanion gap metabolic acidosis  #Hyponatremia  #Hyperkalemia  #Uremia   -Nephrology to see; will likely need new HD (refused in past)  -gentle IV fluids  -CT abd/pelvis reviewed: neg for acute process, 2mm calcification in distal CBD (nml LFTs), consider MRCP at some point     #Leukocytosis  #Viral gastroenteritis   -Unclear etiology, follow fever curve  -CT abdomen pelvis neg  -UA unremarkable  -RVP neg as of yesterday    #CAD s/p CABG    #Hyperlipidemia    #Diabetes mellitus type 2  -A1c 7.0 as of August 2023  -Repeat A1c  -Insulin sliding scale for now    #BPH    #Hypothyroidism        Plan of care discussed with ER physician, patient, patient's son    Teetee Wilhelm, DO    Supplementary Documentation:     The 21st Century Cures Act makes medical notes like these available to patients in the interest of transparency. Please be advised this is a medical document. Medical documents are intended to carry relevant information, facts as evident, and the clinical opinion of the practitioner. The medical note is intended as peer to peer communication and may appear blunt or direct. It is written in medical language and may contain abbreviations or verbiage that are unfamiliar.

## 2024-04-29 NOTE — OCCUPATIONAL THERAPY NOTE
OCCUPATIONAL THERAPY EVALUATION - INPATIENT    Room Number: 405/405-A  Evaluation Date: 4/29/2024     Type of Evaluation: Initial  Presenting Problem: Weakness, nausea    Physician Order: IP Consult to Occupational Therapy  Reason for Therapy:  ADL/IADL Dysfunction and Discharge Planning      OCCUPATIONAL THERAPY ASSESSMENT   Patient is a 73 year old male admitted on 4/28/2024 with Presenting Problem: Weakness, nausea. Co-Morbidities : CKD5, HLD, DM2, CAD s/p CABG, hypothyroidism, BPH  Patient is currently functioning near baseline with toileting, bathing, upper body dressing, lower body dressing, grooming, bed mobility, transfers, stating sitting balance, dynamic sitting balance, static standing balance, dynamic standing balance, maintaining seated position, functional standing tolerance, and energy conservation strategies.  Prior to admission, patient's baseline is Ind/ assist PRN with ADLs.  Patient met all OT goals at Supervision level.  Patient reports no further questions/concerns at this time.     Patient will benefit from continued home with family assist PRN      WEIGHT BEARING RESTRICTION  Weight Bearing Restriction: None                Recommendations for nursing staff:   Transfers: Supervision  Toileting location: Toilet    EVALUATION SESSION:  Patient at start of session: Supine  FUNCTIONAL TRANSFER ASSESSMENT  Sit to Stand: Edge of Bed; Chair  Edge of Bed: Supervision  Chair: Supervision  Toilet Transfer: Supervision    BED MOBILITY  Rolling: Supervision  Supine to Sit : Supervision  Sit to Supine (OT): Supervision    BALANCE ASSESSMENT  Static Sitting: Supervision  Sitting Bilateral: Supervision  Static Standing: Supervision  Standing Bilateral: Supervision    FUNCTIONAL ADL ASSESSMENT  Eating: Supervision  Grooming Seated: Supervision  LB Dressing Seated: Supervision  LB Dressing Standing: Supervision  Toileting Seated: Supervision  Toileting Standing: Supervision      ACTIVITY TOLERANCE: Ed  regarding pacing and EC  Pulse:  (Asymptomatic vitals throughout session)                      O2 SATURATIONS       COGNITION  Overall Cognitive Status:  WFL - within functional limits  COGNITION ASSESSMENTS       Upper Extremity:   ROM: within functional limits  WFL  Strength: is within functional limits  WFL  Coordination:  Gross motor: WFL  Fine motor: WFL  Sensation: Light touch:  intact    EDUCATION PROVIDED  Patient: Role of Occupational Therapy; Plan of Care; Discharge Recommendations; Adaptive Equipment Recommendations; DME Recommendations; Functional Transfer Techniques; Fall Prevention; Energy Conservation; Compensatory ADL Techniques; Proper Body Mechanics  Patient's Response to Education: Returned Demonstration; Verbalized Understanding  Family/Caregiver: Role of Occupational Therapy; Plan of Care; Discharge Recommendations; Adaptive Equipment Recommendations; DME Recommendations; Functional Transfer Techniques  Family/Caregiver's Response to Education: Verbalized Understanding    Equipment used:   Demonstrates functional use    Therapist comments: Pt presents supine in bed. Pt ed regarding role of OT and POC throughout stay. OT facilitated compensatory ADL completion strategies. Pt ed regarding safe functional transfers with proper body mechanics. Pt ed regarding pacing and EC. Pt son present during eval. Pt able to complete LB dressing with figure four and increased time. Pt son reporting pt having assist PRN at home.      Patient End of Session: In bed;Needs met;Call light within reach;RN aware of session/findings;All patient questions and concerns addressed;Alarm set;Family present    OCCUPATIONAL PROFILE    HOME SITUATION  Type of Home: House  Home Layout: One level  Lives With: Family;Son (DIL)    Toilet and Equipment: Standard height toilet  Shower/Tub and Equipment: Tub-shower combo  Other Equipment: None          Drives: No  Patient Regularly Uses: Glasses    Prior Level of Function: Ind with  ADLs has family at home to assist PRN, family drives. Pt recently moved here from [place]    SUBJECTIVE  \"I have discomfort in my foot\"    PAIN ASSESSMENT  Ratin  Location: L foot  Management Techniques: Body mechanics    OBJECTIVE  Precautions:  needed (Son)  Fall Risk: High fall risk    WEIGHT BEARING RESTRICTION  Weight Bearing Restriction: None                AM-PAC ‘6-Clicks’ Inpatient Daily Activity Short Form  -   Putting on and taking off regular lower body clothing?: None  -   Bathing (including washing, rinsing, drying)?: None  -   Toileting, which includes using toilet, bedpan or urinal? : None  -   Putting on and taking off regular upper body clothing?: None  -   Taking care of personal grooming such as brushing teeth?: None  -   Eating meals?: None    AM-PAC Score:  Score: 24  Approx Degree of Impairment: 0%  Standardized Score (AM-PAC Scale): 57.54      ADDITIONAL TESTS     NEUROLOGICAL FINDINGS        PLAN   Patient has been evaluated and presents with no skilled Occupational Therapy needs at this time.  Patient discharged from Occupational Therapy services.  Please re-order if a new functional limitation presents during this admission.      Patient Evaluation Complexity Level:   Occupational Profile/Medical History LOW - Brief history including review of medical or therapy records    Specific performance deficits impacting engagement in ADL/IADL LOW  1 - 3 performance deficits    Client Assessment/Performance Deficits LOW - No comorbidities nor modifications of tasks    Clinical Decision Making LOW - Analysis of occupational profile, problem-focused assessments, limited treatment options    Overall Complexity LOW     OT Session Time: 33 minutes  Self-Care Home Management: 6 minutes  Therapeutic Activity: 12 minutes  Neuromuscular Re-education: 0 minutes  Therapeutic Exercise: 0 minutes  Cognitive Skills: 0 minutes  Sensory Integrative: 0 minutes  Orthotic Management and Trainin  minutes  Can add/delete any of these

## 2024-04-29 NOTE — PROGRESS NOTES
NURSING ADMISSION NOTE      Patient admitted via Cart  Oriented to room.  Safety precautions initiated.  Bed in low position.  Call light in reach.    Patient is a/ o x4. Speaks [place]. Understand very little english. Patient's son at the bedside. Denies nausea at this time. IVF infusing. Patient's son also reports\" patient had fever at home.\" Low grade fevers on admission/,T. Max 100.0.

## 2024-04-29 NOTE — ED PROVIDER NOTES
Patient Seen in: [facility] Emergency Department      History     Chief Complaint   Patient presents with    Nausea/Vomiting/Diarrhea     Was seen here yesterday for vomiting, can't stop vomiting.     Fever     103 fever.      Stated Complaint:     Subjective:   HPI    Patient is a 73-year-old male who has been having vomiting that started yesterday.  Was seen for the same in the ER yesterday.  Also was little constipated yesterday however did have a bowel movement today.  Does have a history of care chronic kidney disease he has been feeling very weak and fatigued.  Also when he vomited vomit was quite dark.  Apparently he has not ever had dialysis and has been holding off as long as he can he is normally gets all his care in [place] but just recently moved here.    Objective:   Past Medical History:    CKD (chronic kidney disease)    Diabetes (HCC)    Essential hypertension    HTN (hypertension)    Renal disorder              History reviewed. No pertinent surgical history.             Social History     Socioeconomic History    Marital status:    Tobacco Use    Smoking status: Never     Passive exposure: Never    Smokeless tobacco: Never   Substance and Sexual Activity    Alcohol use: Never    Drug use: Never              Review of Systems    Positive for stated complaint:   Other systems are as noted in HPI.  Constitutional and vital signs reviewed.      All other systems reviewed and negative except as noted above.    Physical Exam     ED Triage Vitals [04/28/24 2047]   BP (!) 167/66   Pulse 99   Resp 24   Temp 99.6 °F (37.6 °C)   Temp src Temporal   SpO2 100 %   O2 Device None (Room air)       Current:/53   Pulse 71   Temp 99.6 °F (37.6 °C) (Temporal)   Resp 20   Ht 165.1 cm (5' 5\")   Wt 50.8 kg   SpO2 99%   BMI 18.64 kg/m²         Physical Exam      Vital signs reviewed  General appearance: Patient is alert and in no acute distress  HEENT: Pupils equal react to light extraocular  muscles intact no scleral icterus, mucous membranes are dry there is no erythema or exudate in the posterior pharynx  Neck: Supple no JVD no lymphadenopathy no meningismus no carotid bruit  CV: Regular rate and rhythm no murmur rub  Respiratory: Clear to auscultation bilaterally no crackles no wheezes no accessory muscle use  Abdomen: Soft nontender nondistended, no rebound no guarding  no hepatosplenomegaly bowel sounds are present , no pulsatile mass  Extremities: No clubbing cyanosis or edema 2+ distal pulses.  Neuro: Cranial nerves II through XII intact with no gross focal sensory or motor abnormality.      ED Course     Labs Reviewed   COMP METABOLIC PANEL (14) - Abnormal; Notable for the following components:       Result Value    Glucose 227 (*)     Sodium 135 (*)     CO2 18.0 (*)     BUN 97 (*)     Creatinine 8.48 (*)     Calculated Osmolality 317 (*)     eGFR-Cr 6 (*)     Alkaline Phosphatase 134 (*)     Albumin 2.8 (*)     Globulin  5.0 (*)     A/G Ratio 0.6 (*)     All other components within normal limits   LIPASE - Abnormal; Notable for the following components:    Lipase 97 (*)     All other components within normal limits   POCT GLUCOSE - Abnormal; Notable for the following components:    POC Glucose 232 (*)     All other components within normal limits   CBC W/ DIFFERENTIAL - Abnormal; Notable for the following components:    WBC 16.8 (*)     RBC 3.20 (*)     HGB 9.5 (*)     HCT 28.7 (*)     Neutrophil Absolute Prelim 14.93 (*)     Neutrophil Absolute 14.93 (*)     Lymphocyte Absolute 0.83 (*)     All other components within normal limits   CBC WITH DIFFERENTIAL WITH PLATELET    Narrative:     The following orders were created for panel order CBC With Differential With Platelet.  Procedure                               Abnormality         Status                     ---------                               -----------         ------                     CBC W/ DIFFERENTIAL[[id number]]          Abnormal             Final result                 Please view results for these tests on the individual orders.   RAINBOW DRAW LAVENDER   RAINBOW DRAW LIGHT GREEN   RAINBOW DRAW BLUE   RAINBOW DRAW GOLD     EKG    Rate, intervals and axes as noted on EKG Report.  Rate: 102  Rhythm: Sinus Rhythm  Reading: Sinus tachycardia                 CBC chemistry urinalysis and a lipase was ordered.  Patient's bicarb was 18 BUN of 97 and creatinine 8.48.  Hemoglobin was 9.5 and white count was 16.8.  Did give him a liter of normal saline as he apparently still does make urine however based on his BUN I do feel he is quite uremic which is probably what is making him feel sick.  Also will just do a quick CAT scan of his abdomen without contrast and I will discuss case with nephrology in the hospital as he does appear he will need to be admitted as a feel he is finally getting to uremic which is causing his symptoms       CT ABDOMEN+PELVIS(CPT=74176)    Result Date: 4/28/2024  CONCLUSION:   1. The appendix is normal.  No evidence of an acute inflammatory process.  2. No calculi in the kidneys.  No hydronephrosis.   3. There is a new tiny 2 mm calcification in the very distal common bile duct.  This may represent choledocholithiasis.  However, there is no significant biliary ductal dilatation.  MRCP may be done for further evaluation if needed.    LOCATION:  [facility]   Dictated by (CST): Kiet Hoang MD on 4/28/2024 at 11:20 PM     Finalized by (CST): Kiet Hoang MD on 4/28/2024 at 11:24 PM       US ABDOMEN COMPLETE (CPT=76700)    Result Date: 4/27/2024  CONCLUSION:  1. Cholelithiasis without secondary evidence of acute cholecystitis. 2. Findings suggestive of possible mild hepatic steatosis. 3. Bilateral mild 8 trophic kidneys with echogenic parenchyma, which can be seen in the setting of medical renal disease, correlate clinically.   LOCATION:  [facility]    Dictated by ([facility]): Deshawn Pires MD on 4/27/2024 at 11:51 AM     Finalized  by (CST): Deshawn Pires MD on 4/27/2024 at 11:54 AM       XR CHEST AP PORTABLE  (CPT=71045)    Result Date: 4/27/2024  CONCLUSION:  No acute cardiopulmonary findings.  No significant interval changes compared to 8/21/2023.   LOCATION:  [facility]      Dictated by (CST): Deshawn Pires MD on 4/27/2024 at 11:11 AM     Finalized by (CST): Deshawn Pires MD on 4/27/2024 at 11:12 AM          CT scan was done today and did show the ultrasound from yesterday.  Does have a little 2 mm calcification in the very distal common bile duct could be choledocholithiasis however I do feel based on patient's symptoms he does sound uremic however his gallbladder may need to be evaluated after they get his renal function and hydration status under control.  Discussed with the hospitalist along with nephrology who agrees.  Nephrology definitely feels patient is going to need to be dialyzed will admit him and give him some IV fluids currently    Patient was seen immediately upon arrival due to a high probability of sudden, clinically significant, or life threatening deterioration of the patient's clinical status.  The patient required my time at the bedside performing direct personal management, the absence of which would likely result in sudden, clinically significant or life threatening deterioration of  clinical condition.   The patient required my constant attention. Time was spent ordering, reviewing, and interpreting ancillary testing and imaging. The patient was frequently reevaluated, and I made frequent checks of  vital signs and monitor. Nursing notes were reviewed.     Critical care time was[60 minutes], and exclusive of procedures.  MDM      Differential diagnosis reflecting the complexity of care include: Uremic, acute on chronic renal failure, pancreatitis, dehydration    Comorbidities that add complexity to management include: Chronic renal failure however no dialysis hypertension and diabetes    External chart review was  done and was noted: ER note from yesterday was reviewed and patient did have labs done at that point but BUN is going up and creatinine is also slightly taking up.  Bicarb is also going down.  Patient wanted to be discharged is feeling better after the fluids and they are supposed to follow-up with urology but patient was feeling sicker today so returned    History obtained by an independent source was from: Son is at bedside who helps translate and explain his father's conditions.    Discussions of management was done with: Nephrology and the hospitalist were contacted and patient will be admitted for possible dialysis and further workup    My independent interpretation of studies of: CT scan did show some cholelithiasis and a questionable small stone in the distal common bile duct chest x-ray was unremarkable and ultrasound from yesterday did show some cholelithiasis but no evidence of cholecystitis    Diagnostic tests and medications considered but not ordered were: Urgent dialysis was considered but do feel they can do this on the floor as potassium is stable at this point    Social determinants of health that affect care: She just recently moved here from [place] and is establishing healthcare as son is going to need to help take care of him    Shared decision making was done by myself patient patient's son hospitalist and nephrology.  May need to get GI on board once his kidney function is stabilized    Patient was evaluated in the emergency department and at this point patient will need admission for further management of medical condition.  Patient was stable in the emergency department and will be transferred to floor for further definitive care.  Patient's questions were answered.    This note was prepared using Dragon Medical voice recognition dictation software. As a result errors may occur. When identified these errors have been corrected. While every attempt is made to correct errors during  dictation discrepancies may still exist      Admission disposition: 4/28/2024 11:27 PM                                        Medical Decision Making      Disposition and Plan     Clinical Impression:  1. Weakness generalized    2. Acute renal failure superimposed on stage 5 chronic kidney disease, not on chronic dialysis, unspecified acute renal failure type (HCC)    3. Anemia, unspecified type    4. Leukocytosis, unspecified type    5. Uremia    6. Metabolic acidosis         Disposition:  Admit  4/28/2024 11:27 pm    Follow-up:  No follow-up provider specified.        Medications Prescribed:  Current Discharge Medication List                            Hospital Problems       Present on Admission             ICD-10-CM Noted POA    * (Principal) Weakness generalized R53.1 4/28/2024 Unknown

## 2024-04-30 ENCOUNTER — APPOINTMENT (OUTPATIENT)
Dept: MRI IMAGING | Facility: HOSPITAL | Age: 74
DRG: 683 | End: 2024-04-30
Attending: NURSE PRACTITIONER
Payer: MEDICAID

## 2024-04-30 ENCOUNTER — APPOINTMENT (OUTPATIENT)
Dept: MRI IMAGING | Facility: HOSPITAL | Age: 74
End: 2024-04-30
Attending: NURSE PRACTITIONER
Payer: MEDICAID

## 2024-04-30 PROBLEM — R50.9 FEVER: Status: ACTIVE | Noted: 2024-04-30

## 2024-04-30 LAB
ANION GAP SERPL CALC-SCNC: 9 MMOL/L (ref 0–18)
ATRIAL RATE: 102 BPM
BUN BLD-MCNC: 84 MG/DL (ref 9–23)
CALCIUM BLD-MCNC: 7.4 MG/DL (ref 8.5–10.1)
CHLORIDE SERPL-SCNC: 107 MMOL/L (ref 98–112)
CO2 SERPL-SCNC: 23 MMOL/L (ref 21–32)
CREAT BLD-MCNC: 7.64 MG/DL
EGFRCR SERPLBLD CKD-EPI 2021: 7 ML/MIN/1.73M2 (ref 60–?)
ERYTHROCYTE [DISTWIDTH] IN BLOOD BY AUTOMATED COUNT: 12.5 %
GLUCOSE BLD-MCNC: 168 MG/DL (ref 70–99)
GLUCOSE BLD-MCNC: 175 MG/DL (ref 70–99)
GLUCOSE BLD-MCNC: 202 MG/DL (ref 70–99)
GLUCOSE BLD-MCNC: 213 MG/DL (ref 70–99)
GLUCOSE BLD-MCNC: 257 MG/DL (ref 70–99)
HCT VFR BLD AUTO: 23.3 %
HGB BLD-MCNC: 7.7 G/DL
MAGNESIUM SERPL-MCNC: 2.1 MG/DL (ref 1.6–2.6)
MCH RBC QN AUTO: 30.3 PG (ref 26–34)
MCHC RBC AUTO-ENTMCNC: 33 G/DL (ref 31–37)
MCV RBC AUTO: 91.7 FL
OSMOLALITY SERPL CALC.SUM OF ELEC: 318 MOSM/KG (ref 275–295)
P AXIS: 52 DEGREES
P-R INTERVAL: 162 MS
PLATELET # BLD AUTO: 198 10(3)UL (ref 150–450)
POTASSIUM SERPL-SCNC: 4.4 MMOL/L (ref 3.5–5.1)
Q-T INTERVAL: 318 MS
QRS DURATION: 80 MS
QTC CALCULATION (BEZET): 414 MS
R AXIS: -25 DEGREES
RBC # BLD AUTO: 2.54 X10(6)UL
SODIUM SERPL-SCNC: 139 MMOL/L (ref 136–145)
T AXIS: 64 DEGREES
VENTRICULAR RATE: 102 BPM
WBC # BLD AUTO: 14.6 X10(3) UL (ref 4–11)

## 2024-04-30 PROCEDURE — 99233 SBSQ HOSP IP/OBS HIGH 50: CPT | Performed by: INTERNAL MEDICINE

## 2024-04-30 PROCEDURE — 74181 MRI ABDOMEN W/O CONTRAST: CPT | Performed by: NURSE PRACTITIONER

## 2024-04-30 PROCEDURE — 76376 3D RENDER W/INTRP POSTPROCES: CPT | Performed by: NURSE PRACTITIONER

## 2024-04-30 PROCEDURE — 99233 SBSQ HOSP IP/OBS HIGH 50: CPT | Performed by: HOSPITALIST

## 2024-04-30 RX ORDER — ALPRAZOLAM 0.25 MG/1
0.25 TABLET ORAL
Status: COMPLETED | OUTPATIENT
Start: 2024-04-30 | End: 2024-04-30

## 2024-04-30 NOTE — PLAN OF CARE
Patient is alert, Marathi speaking. Family member at bedside assisting with translation per patient request. Patient reports blood in stool, MD Juma notified - GI consult received, Dr. Chacon notified. NPO for MRI/MRCP, checklist completed with family member's assistance. Patient reports claustrophobia - MD Juma notified - orders received. Communicated need for time to give PRN with MRI, MRI technician reported will call RN ahead of time. Call light within reach.

## 2024-04-30 NOTE — DIETARY NOTE
[facility]   part of [facility]   CLINICAL NUTRITION    Elliott Enamorado     Admitting diagnosis:  Metabolic acidosis [E87.20]  Uremia [N19]  Weakness generalized [R53.1]  Anemia, unspecified type [D64.9]  Leukocytosis, unspecified type [D72.829]  Acute renal failure superimposed on stage 5 chronic kidney disease, not on chronic dialysis, unspecified acute renal failure type (HCC) [N17.9, N18.5]    Ht: 165.1 cm (5' 5\")  Wt: 48.5 kg (107 lb).   Body mass index is 17.81 kg/m².    Wt Readings from Last 6 Encounters:   04/29/24 48.5 kg (107 lb)   04/27/24 49 kg (108 lb)   08/22/23 53.1 kg (117 lb)   08/07/23 55.8 kg (123 lb)   08/01/23 55.8 kg (123 lb)      Labs/Meds reviewed    Diet:       Procedures    Clear liquid diet Is Patient on Accuchecks? Yes     Percent Meals Eaten (last 3 days)       Date/Time Percent Meals Eaten (%)    04/29/24 1132 100 %          Pt chart reviewed d/t low BMI. Pt sleeping upon visit. Wt loss non-significant. Noted no need for HD at this time. Monitor for diet adv and po intakes.   Patient with good appetite at this time.   Nursing notes reports Percent Meals Eaten (%): 100 % intake for last meal.  Tolerating po diet without diarrhea, emesis, or constipation.     Patient is at low nutrition risk at this time.    Please consult if patient status changes or nutrition issues arise.    Katlyn Winkler RD, LDN  Clinical Dietitian

## 2024-04-30 NOTE — PROGRESS NOTES
[facility]  Progress Note    Elliott Enamorado Patient Status:  Inpatient    1950 MRN [id number]   [facility] 4NW-A Attending Parag Dennison MD   Hosp Day # 1 PCP No primary care provider on file.       Feels OK this am  + fever yesterday pm  Denies any cp/sob  No n/v  Denies any diarrhea           Current Medications:    Current Facility-Administered Medications:     glucose (Dex4) 15 GM/59ML oral liquid 15 g, 15 g, Oral, Q15 Min PRN **OR** glucose (Glutose) 40% oral gel 15 g, 15 g, Oral, Q15 Min PRN **OR** glucose-vitamin C (Dex-4) chewable tab 4 tablet, 4 tablet, Oral, Q15 Min PRN **OR** dextrose 50% injection 50 mL, 50 mL, Intravenous, Q15 Min PRN **OR** glucose (Dex4) 15 GM/59ML oral liquid 30 g, 30 g, Oral, Q15 Min PRN **OR** glucose (Glutose) 40% oral gel 30 g, 30 g, Oral, Q15 Min PRN **OR** glucose-vitamin C (Dex-4) chewable tab 8 tablet, 8 tablet, Oral, Q15 Min PRN    insulin aspart (NovoLOG) 100 Units/mL FlexPen 1-10 Units, 1-10 Units, Subcutaneous, TID AC and HS    acetaminophen (Tylenol Extra Strength) tab 500 mg, 500 mg, Oral, Q4H PRN    melatonin tab 3 mg, 3 mg, Oral, Nightly PRN    polyethylene glycol (PEG 3350) (Miralax) 17 g oral packet 17 g, 17 g, Oral, Daily PRN    sennosides (Senokot) tab 17.2 mg, 17.2 mg, Oral, Nightly PRN    bisacodyl (Dulcolax) 10 MG rectal suppository 10 mg, 10 mg, Rectal, Daily PRN    ondansetron (Zofran) 4 MG/2ML injection 4 mg, 4 mg, Intravenous, Q6H PRN    metoclopramide (Reglan) 5 mg/mL injection 5 mg, 5 mg, Intravenous, Q8H PRN    heparin (Porcine) 5000 UNIT/ML injection 5,000 Units, 5,000 Units, Subcutaneous, 2 times per day    folic acid (Folvite) tab 1 mg, 1 mg, Oral, Daily    levothyroxine (Synthroid) tab 75 mcg, 75 mcg, Oral, Before breakfast    rosuvastatin (Crestor) tab 10 mg, 10 mg, Oral, Daily    tamsulosin (Flomax) cap 0.4 mg, 0.4 mg, Oral, Daily    sodium bicarbonate 75 mEq in dextrose 5%-sodium chloride 0.45% 1,075 mL infusion, 75  mEq, Intravenous, Continuous    cefTRIAXone (Rocephin) 1 g in D5W 100 mL IVPB-ADD, 1 g, Intravenous, Q24H  Home Medications:  No current outpatient medications on file.       Review of Systems:  See HPI; A total of 12 systems reviewed and otherwise unremarkable.    Physical Exam:  Vital signs: Blood pressure 105/59, pulse 70, temperature 97.8 °F (36.6 °C), temperature source Oral, resp. rate 18, height 5' 5\" (1.651 m), weight 107 lb (48.5 kg), SpO2 100%.  General: No acute distress. Alert and oriented x 3  HEENT: Moist mucous membranes. EOM-I. PERRL  Neck: No lymphadenopathy.  No JVD. No carotid bruits.  Respiratory: Clear to auscultation bilaterally.  No wheezes. No rhonchi.  Cardiovascular: S1, S2.  Regular rate and rhythm.  No murmurs. Equal pulses   Abdomen: Soft, nontender, nondistended.  Positive bowel sounds. No rebound tenderness   Neurologic: No focal neurological deficits.   Musculoskeletal: Full range of motion of all extremities.  No swelling noted.   Integument: No lesions. No erythema.  Psychiatric: Appropriate mood and affect.    Recent Labs     04/28/24 2054 04/29/24  0616 04/30/24  0803   WBC 16.8* 18.6* 14.6*   HGB 9.5* 7.4* 7.7*   MCV 89.7 94.4 91.7   .0 195.0 198.0       Recent Labs     04/28/24 2054 04/29/24  0616 04/30/24  0642   * 139 139   K 5.0 4.9 4.4    114* 107   CO2 18.0* 18.0* 23.0   BUN 97* 90* 84*   CREATSERUM 8.48* 8.04* 7.64*   CA 8.5 7.3* 7.4*   MG  --  2.4 2.1       Recent Labs     04/28/24 2054 04/29/24  0616   ALT 20 16   AST 30 12*   ALB 2.8* 2.2*       Impression:  BIJU/CKD- stg V; related to DM per history. Acute component maybe related to acute febrile illness. He is mildly volume depleted.   He has uremic symptoms  - again, recommended starting HD ,but pt is hesitant; risk/benefits reviewed. No urgent need fortunately.  - continue fluids  - check urine chem  - monitor labs  Acidosis- related to # 1 improved  Hyperkalemia ; improved  N/v- morenitaley  component of gastroenteritis + uremia- symptomatic care  Fever; w/u ongoing; cultures NGTD  Anemia due to chronic disease/ckd-IV iron/SHAY  DM        Thank you for allowing me to participate in this patient's care.  Please feel free to call me with any questions or concerns.    Anton Lopez MD  4/30/2024

## 2024-04-30 NOTE — CONSULTS
[facility]                       Gastroenterology Consultation-[facility] Gastroenterology    Elliott Enamorado Patient Status:  Inpatient    1950 MRN [id number]   Location [facility] 4NW-A Attending Parag Dennison MD   Hosp Day # 1 PCP No primary care provider on file.     Reason for consultation: Coffee-ground emesis, hematochezia   HPI: This is a 73 yr-old male with PMhx that includes CAD, dyslipidemia, HTN, DM,BPH, and CKD who was admitted 24 with nausea, vomiting, fevers. While in ER pt reportedly with an episode of coffee-ground emesis without any further episodes of vomiting and today after being constipated passed a formed stool mixed with red blood. No family at bedside--call was placed with no answer, and pt not interested in using hospital language line and waves away translation phone. Hx has therefore been obtained via discussion with bedside RN + EMR review.   No reports of abd pain to bedside RN and no episodes of vomiting since ER. No documentation noted of endoscopic eval.   CT a/p (non-contrast; ) suggested 2 mm calcification in the very distal CBD; labs markedly elevated BUN/creat--(107/8.88 on arrival now 84/7.6), Ca 7.4, normal lactic acid, RE (Hgb 7.7 from 9.5--MCV 92; iron 26, sat 17, ferritin 257)  PMHx:   Past Medical History:    CKD (chronic kidney disease)    Diabetes (HCC)    Essential hypertension    HTN (hypertension)    Renal disorder                PSHx: History reviewed. No pertinent surgical history.  Medications:    iron sucrose (Venofer) 20 mg/mL injection 200 mg  200 mg Intravenous Daily    [COMPLETED] epoetin lucinda (Epogen, Procrit) 58441 UNIT/ML injection 10,000 Units  10,000 Units Subcutaneous Once    glucose (Dex4) 15 GM/59ML oral liquid 15 g  15 g Oral Q15 Min PRN    Or    glucose (Glutose) 40% oral gel 15 g  15 g Oral Q15 Min PRN    Or    glucose-vitamin C (Dex-4) chewable tab 4 tablet  4 tablet Oral Q15 Min PRN    Or    dextrose 50% injection 50 mL   50 mL Intravenous Q15 Min PRN    Or    glucose (Dex4) 15 GM/59ML oral liquid 30 g  30 g Oral Q15 Min PRN    Or    glucose (Glutose) 40% oral gel 30 g  30 g Oral Q15 Min PRN    Or    glucose-vitamin C (Dex-4) chewable tab 8 tablet  8 tablet Oral Q15 Min PRN    insulin aspart (NovoLOG) 100 Units/mL FlexPen 1-10 Units  1-10 Units Subcutaneous TID AC and HS    acetaminophen (Tylenol Extra Strength) tab 500 mg  500 mg Oral Q4H PRN    melatonin tab 3 mg  3 mg Oral Nightly PRN    polyethylene glycol (PEG 3350) (Miralax) 17 g oral packet 17 g  17 g Oral Daily PRN    sennosides (Senokot) tab 17.2 mg  17.2 mg Oral Nightly PRN    bisacodyl (Dulcolax) 10 MG rectal suppository 10 mg  10 mg Rectal Daily PRN    ondansetron (Zofran) 4 MG/2ML injection 4 mg  4 mg Intravenous Q6H PRN    metoclopramide (Reglan) 5 mg/mL injection 5 mg  5 mg Intravenous Q8H PRN    heparin (Porcine) 5000 UNIT/ML injection 5,000 Units  5,000 Units Subcutaneous 2 times per day    folic acid (Folvite) tab 1 mg  1 mg Oral Daily    levothyroxine (Synthroid) tab 75 mcg  75 mcg Oral Before breakfast    rosuvastatin (Crestor) tab 10 mg  10 mg Oral Daily    tamsulosin (Flomax) cap 0.4 mg  0.4 mg Oral Daily    sodium bicarbonate 75 mEq in dextrose 5%-sodium chloride 0.45% 1,075 mL infusion  75 mEq Intravenous Continuous    cefTRIAXone (Rocephin) 1 g in D5W 100 mL IVPB-ADD  1 g Intravenous Q24H    [COMPLETED] ondansetron (Zofran) 4 MG/2ML injection 4 mg  4 mg Intravenous Once    [COMPLETED] sodium chloride 0.9 % IV bolus 1,000 mL  1,000 mL Intravenous Once     Allergies:   Allergies   Allergen Reactions    Ibuprofen RASH    Sulfa Antibiotics RASH     Social HX:   Social History     Socioeconomic History    Marital status:    Tobacco Use    Smoking status: Never     Passive exposure: Never    Smokeless tobacco: Never   Substance and Sexual Activity    Alcohol use: Never    Drug use: Never     Social Determinants of Health     Food Insecurity: No Food  Insecurity (4/29/2024)    Food Insecurity     Food Insecurity: Never true   Transportation Needs: No Transportation Needs (4/29/2024)    Transportation Needs     Lack of Transportation: No   Housing Stability: Low Risk  (4/29/2024)    Housing Stability     Housing Instability: No      FamHx: The patient has no family history of colon cancer or other gastrointestinal malignancies;  No family history of ulcer disease, or inflammatory bowel disease  ROS:  In addition to the pertinent positives described above:            Infectious Disease:  No chronic infections or recent fevers prior to the acute illness            Cardiovascular: + HTN, CAD, dyslipidemia             Respiratory: No shortness of breath, asthma, copd, recurrent pneumonia            Hematologic: The patient reports no easy bruising, frequent gum bleeding or nose bleeding;  The patient has no history of known chronic anemia            Dermatologic: The patient reports no recent rashes or chronic skin disorders            Rheumatologic: The patient reports no history of chronic arthritis, myalgias, arthralgias            Genitourinary:  + CKD, BPH           Psychiatric: The patient reports no history of depression, anxiety, suicidal ideation, or homicidal ideation           Oncologic: The patient reports no history of prior solid tumor or hematologic malignancy           ENT: The patient reports no hoarseness of voice, hearing loss, sinus congestion, tinnitus           Neurologic: The patient reports no history of seizure, stroke, or frequent headaches  PE: /54 (BP Location: Left arm)   Pulse 54   Temp 97.6 °F (36.4 °C) (Oral)   Resp 18   Ht 5' 5\" (1.651 m)   Wt 107 lb (48.5 kg)   SpO2 100%   BMI 17.81 kg/m²   Gen: Awake, alert   HENT: EOMI, PERRL, oropharynx is clear with moist mucosal membranes  Eyes: Sclerae are anicteric  Neck:  Supple without nuchal rigidity  CV: Regular rate and rhythm, with normal S1 and S2  Resp: Clear to  auscultation bilaterally without wheezes; rubs, rhonchi, or rales  Abdomen: Soft, non-tender, non-distended with the presence of bowel sounds; No hepatosplenomegaly; no rebound or guarding; No ascites is clinically apparent; no tympany to percussion  Ext: No peripheral edema or cyanosis  Skin: Warm and dry  Psychiatric: Appropriate mood and congruent affect without obvious depression or anxiety  Labs:   Lab Results   Component Value Date    WBC 14.6 04/30/2024    HGB 7.7 04/30/2024    HCT 23.3 04/30/2024    .0 04/30/2024    CREATSERUM 7.64 04/30/2024    BUN 84 04/30/2024     04/30/2024    K 4.4 04/30/2024     04/30/2024    CO2 23.0 04/30/2024     04/30/2024    CA 7.4 04/30/2024    MG 2.1 04/30/2024     Recent Labs   Lab 04/28/24 2054 04/29/24  0616 04/30/24  0642   * 118* 175*   BUN 97* 90* 84*   CREATSERUM 8.48* 8.04* 7.64*   CA 8.5 7.3* 7.4*   * 139 139   K 5.0 4.9 4.4    114* 107   CO2 18.0* 18.0* 23.0     Recent Labs   Lab 04/29/24  0616 04/30/24  0803   RBC 2.49* 2.54*   HGB 7.4* 7.7*   HCT 23.5* 23.3*   MCV 94.4 91.7   MCH 29.7 30.3   MCHC 31.5 33.0   RDW 12.4 12.5   NEPRELIM 15.43*  --    WBC 18.6* 14.6*   .0 198.0       Recent Labs   Lab 04/27/24  0930 04/28/24 2054 04/29/24  0616   ALT 17 20 16   AST 20 30 12*       Imaging:   PROCEDURE:  CT ABDOMEN+PELVIS (CPT=74176)     COMPARISON:  [facility] , US, US ABDOMEN COMPLETE (CPT=76700), 4/27/2024, 11:20 AM.  [facility] , CT, CT ABDOMEN+PELVIS(CPT=74176), 8/21/2023, 7:27 PM.     INDICATIONS:  abd pain     TECHNIQUE:  Unenhanced multislice CT scanning was performed from the dome of the diaphragm to the pubic symphysis.  Dose reduction techniques were used. Dose information is transmitted to the ACR (American College of Radiology) NRDR (National Radiology  Data Registry) which includes the Dose Index Registry.     PATIENT STATED HISTORY: (As transcribed by Technologist)  Fever, constipation, nausea and vomiting.          FINDINGS:    LIVER:  No enlargement, atrophy, abnormal density, or significant focal lesion.    BILIARY:  2 mm calcification in the distal common bile duct is noted.  No significant dilatation of the common bile duct.  6 mm calcified gallstone in the gallbladder.  PANCREAS:  No lesion, fluid collection, ductal dilatation, or atrophy.    SPLEEN:  No enlargement or focal lesion.    KIDNEYS:  Cyst in the upper pole of the left kidney measures 2.0 x 1.9 cm.  ADRENALS:  Mild thickening of the adrenal glands.  AORTA/VASCULAR:    Unremarkable as seen on non-contrast imaging.  RETROPERITONEUM:  Minimally prominent retroperitoneal lymph node adjacent to the left adrenal gland measures 1.3 x 0.9 cm.  This is unchanged since prior exam.    BOWEL/MESENTERY:  The appendix is unremarkable.  ABDOMINAL WALL:  Bilateral fat containing inguinal hernias.  URINARY BLADDER:  Mild bladder wall thickening.  PELVIC NODES:  No adenopathy.    PELVIC ORGANS:  Prostate gland measures 5.1 cm.  BONES:  No bony lesion or fracture.    LUNG BASES:  No visible pulmonary or pleural disease.    OTHER:  Negative.                     Impression   CONCLUSION:       1. The appendix is normal.  No evidence of an acute inflammatory process.     2. No calculi in the kidneys.  No hydronephrosis.       3. There is a new tiny 2 mm calcification in the very distal common bile duct.  This may represent choledocholithiasis.  However, there is no significant biliary ductal dilatation.  MRCP may be done for further evaluation if needed.          LOCATION:  [facility]        Dictated by (CST): Kiet Hoang MD on 4/28/2024 at 11:20 PM      Finalized by (CST): Kiet Hoang MD on 4/28/2024 at 11:24 PM      Impression: 73 yr-old male with hx of CAD, dyslipidemia, HTN, DM,BPH, and CKD admitted 4/28/24 with nausea, vomiting, fevers found to have BIJU/CKD--BUN/creat--107/8.88 on arrival now 84/7.6. GI consulted for an episode of coffee-ground emesis on arrival to  ER + an episode of hematochezia today. Could pursue bidirectional endoscopy if pt becomes agreeable however pending clinical course and lytes. In the interm will also r/o choledocholithiasis however less likely given normal LFTs and no abd pain   Recommendations:     Protonix 40 mg IV BID  Monitor for overt GI bleeding   MRCP to r/o choledocholithiasis  Consider EGD + colonoscopy pending clinical course--kidney function, electrolytes, and overt GI bleeding   Continue IV iron supplementation per renal recommendations   CBC, CMP in AM     Thank you for the consultation, we will follow the patient with you.  Attending addendum (Dr Chacon) to follow later today and provide formal, final recommendations at that time   Mica Cox, APRN  1:10 PM  4/30/2024  [facility]  [phone]    GI Attending Addendum:  I have personally seen and examined this patient and agree with above nurse practitioner's history, physical exam, assessment and recommendations with the following modifications and/or additions:     Patient is a 73 year old M w/ a hx of CAD, HTN, DM, CKD admitted with nausea, vomiting and fevers. GI consult for coffee ground emesis, and BRBPR. Renal consulted for BIJU/CKD, patient refusing HD. Hypocalcemia is noted. Elevated BNP. Acidosis. On exam, abd soft NTND.     Patient warrants EGD, Colonoscopy when electrolyte abnormalities, specifically Ca corrected, to safely undergo anesthesia. Appreciate renal recs, and recommendation for HD - which I agree with. Acute on chronic anemia, likely superimpose on CKD / AoCD.    Timing of bi-directional endoscopy pending above.   IVF, analgesia, anti-emetics per primary team  PPI IV q12 hrs  Check CBC, transfuse pRBC if Hg < 7  MRCP given CBD findings, pt asymptomatic and LFTs wnl      Chris Chacon MD  4/30/2024  [facility]

## 2024-04-30 NOTE — PROGRESS NOTES
[facility]   part of [facility]     Hospitalist Progress Note     Elliott Enamorado Patient Status:  Inpatient    1950 MRN [id number]   [facility] 4NW-A Attending Parag Dennison MD   Hosp Day # 1 PCP No primary care provider on file.     Chief Complaint: Nausea and vomiting    Subjective:     Patient having constipation, complaining of blood in his stools.  Denies fever, chills, mild nausea, no other acute complaints.      Objective:    Review of Systems:   A comprehensive review of systems was completed; pertinent positive and negatives stated in subjective.    Vital signs:  Temp:  [97.6 °F (36.4 °C)-102.9 °F (39.4 °C)] 97.6 °F (36.4 °C)  Pulse:  [54-71] 54  Resp:  [17-20] 18  BP: (105-159)/(40-64) 124/54  SpO2:  [98 %-100 %] 100 %    Physical Exam:    General: No acute distress, pleasant   Respiratory: No wheezes, no rhonchi  Cardiovascular: S1, S2, regular rate and rhythm  Abdomen: Soft, Non-tender, non-distended, positive bowel sounds  Neuro: No new focal deficits.   Extremities: No edema    Diagnostic Data:    Labs:  Recent Labs   Lab 24  0924  0616 24  0803   WBC 12.1* 16.8* 18.6* 14.6*   HGB 10.4* 9.5* 7.4* 7.7*   MCV 89.9 89.7 94.4 91.7   .0 239.0 195.0 198.0       Recent Labs   Lab 24  0924  0616 24  0642   * 227* 118* 175*   * 97* 90* 84*   CREATSERUM 8.88* 8.48* 8.04* 7.64*   CA 8.5 8.5 7.3* 7.4*   ALB 2.9* 2.8* 2.2*  --    * 135* 139 139   K 4.8 5.0 4.9 4.4    106 114* 107   CO2 20.0* 18.0* 18.0* 23.0   ALKPHO 119* 134* 91  --    AST 20 30 12*  --    ALT 17 20 16  --    BILT 0.3 0.3 0.3  --    TP 8.0 7.8 6.1*  --        Estimated Creatinine Clearance: 5.8 mL/min (A) (based on SCr of 7.64 mg/dL (H)).    No results for input(s): \"TROP\", \"TROPHS\", \"CK\" in the last 168 hours.    No results for input(s): \"PTP\", \"INR\" in the last 168 hours.               Microbiology    No  results found for this visit on 04/28/24.      Imaging: Reviewed in Epic.    Medications:    iron sucrose  200 mg Intravenous Daily    insulin aspart  1-10 Units Subcutaneous TID AC and HS    heparin  5,000 Units Subcutaneous 2 times per day    folic acid  1 mg Oral Daily    levothyroxine  75 mcg Oral Before breakfast    rosuvastatin  10 mg Oral Daily    tamsulosin  0.4 mg Oral Daily    cefTRIAXone  1 g Intravenous Q24H       Assessment & Plan:      #Acute on CKD stage V  #Nonanion gap metabolic acidosis  #Hyponatremia  #Hyperkalemia  #Uremia   Nephrology consult appreciated  Acidosis improved on bicarb drip  Concern for continued uremia - Bun 84  Nephro still recommending HD, pt/family would like to hold off  Fluids for volume resuscitation   Trend labs    #Rectal bleeding  #Constipation   Hg 10.4 -> 9.5 -> 7.7  GI consult    #Fever  CXR clear, u/a clear, RVP neg  Blood cultures pending  Started on Rocephin  WBC improved and afebrile sine  F/u cultures     #Choledocholithiasis?  Noted on CT - 2mm CBD stones  Patient without RUQ abdominal pain  GI consult     #CAD s/p CABG     #Hyperlipidemia      #Diabetes mellitus type 2  -A1c 7.0 as of August 2023  -Repeat A1c  -Insulin sliding scale for now     #BPH - flomax     #Hypothyroidism - synthroid      Parag Dennison MD    Supplementary Documentation:     Quality:  DVT Mechanical Prophylaxis:   SCDs,    DVT Pharmacologic Prophylaxis   Medication    heparin (Porcine) 5000 UNIT/ML injection 5,000 Units                Code Status: Not on file  [place]: No urinary catheter in place  [place] Duration (in days):   Central line:    AUREA:     Discharge is dependent on: clinical status   At this point Mr. Enamorado is expected to be discharge to: Home    The 21st Century Cures Act makes medical notes like these available to patients in the interest of transparency. Please be advised this is a medical document. Medical documents are intended to carry relevant information, facts as evident,  and the clinical opinion of the practitioner. The medical note is intended as peer to peer communication and may appear blunt or direct. It is written in medical language and may contain abbreviations or verbiage that are unfamiliar.

## 2024-04-30 NOTE — PROGRESS NOTES
[facility]   part of [facility]     Hospitalist Progress Note     Elliott Enamorado Patient Status:  Inpatient    1950 MRN [id number]   Location [facility] 4NW-A Attending Parag Dennison MD   Hosp Day # 2 PCP No primary care provider on file.     Chief Complaint: BIJU on CKD    Subjective:   Patient denies abdominal pain. No nausea or vomiting. Had dark loose BM yesterday. On room air.     Current medications:   metRONIDAZOLE  500 mg Intravenous Q8H    iron sucrose  200 mg Intravenous Daily    pantoprazole  40 mg Intravenous Q12H    insulin aspart  1-10 Units Subcutaneous TID AC and HS    [Held by provider] heparin  5,000 Units Subcutaneous 2 times per day    folic acid  1 mg Oral Daily    levothyroxine  75 mcg Oral Before breakfast    rosuvastatin  10 mg Oral Daily    tamsulosin  0.4 mg Oral Daily    cefTRIAXone  1 g Intravenous Q24H       Objective:    Review of Systems:   10 point ROS completed and was negative, except for pertinent positive and negatives stated in subjective.    Vital signs:  Temp:  [98.1 °F (36.7 °C)-101 °F (38.3 °C)] 98.1 °F (36.7 °C)  Pulse:  [59-77] 77  Resp:  [18-20] 18  BP: (144-159)/() 151/71  SpO2:  [92 %-100 %] 92 %  Patient Weight for the past 72 hrs:   Weight   24 112 lb (50.8 kg)   24 0011 107 lb (48.5 kg)     Physical Exam:    General: No acute distress.   Respiratory: Clear to auscultation bilaterally.   Cardiovascular: S1, S2. Regular rate and rhythm.   Abdomen: Soft, nontender, nondistended.  Positive bowel sounds.   Extremities: No edema.  Neuro: AAOx3    Diagnostic Data:    Labs:  Recent Labs   Lab 24  0930 24  2054 24  0616 24  0803 24  0632   WBC 12.1* 16.8* 18.6* 14.6* 12.3*   HGB 10.4* 9.5* 7.4* 7.7* 7.7*   MCV 89.9 89.7 94.4 91.7 89.7   .0 239.0 195.0 198.0 201.0       Recent Labs   Lab 24  0616 24  0642 24  0632   * 118* 175* 169*   BUN 97* 90* 84* 68*    CREATSERUM 8.48* 8.04* 7.64* 7.21*   CA 8.5 7.3* 7.4* 7.3*   ALB 2.8* 2.2*  --  1.9*   * 139 139 138   K 5.0 4.9 4.4 4.1    114* 107 106   CO2 18.0* 18.0* 23.0 24.0   ALKPHO 134* 91  --  85   AST 30 12*  --  15   ALT 20 16  --  17   BILT 0.3 0.3  --  0.3   TP 7.8 6.1*  --  6.0*       Estimated Creatinine Clearance: 6.2 mL/min (A) (based on SCr of 7.21 mg/dL (H)).    No results for input(s): \"PTP\", \"INR\" in the last 168 hours.         COVID-19 Lab Results    COVID-19  Lab Results   Component Value Date    COVID19 Not Detected 04/29/2024    COVID19 Not Detected 04/27/2024    COVID19 Not Detected 08/21/2023       Pro-Calcitonin  No results for input(s): \"PCT\" in the last 168 hours.    Cardiac  No results for input(s): \"TROP\", \"PBNP\" in the last 168 hours.    Creatinine Kinase  No results for input(s): \"CK\" in the last 168 hours.    Inflammatory Markers  Recent Labs   Lab 04/29/24  0616   MELINDA 257.8       No results for input(s): \"TROP\", \"TROPHS\", \"CK\" in the last 168 hours.    Imaging: Imaging data reviewed in Epic.    Medications:    metRONIDAZOLE  500 mg Intravenous Q8H    iron sucrose  200 mg Intravenous Daily    pantoprazole  40 mg Intravenous Q12H    insulin aspart  1-10 Units Subcutaneous TID AC and HS    [Held by provider] heparin  5,000 Units Subcutaneous 2 times per day    folic acid  1 mg Oral Daily    levothyroxine  75 mcg Oral Before breakfast    rosuvastatin  10 mg Oral Daily    tamsulosin  0.4 mg Oral Daily    cefTRIAXone  1 g Intravenous Q24H       Assessment & Plan:    Vomiting, coffee ground emesis   Rectal bleeding   PPI IV BID  Monitor H&H   Cholelithiasis, trace gall bladder wall thickening and pericholecystic fluid suggestive of acute cholecystitis, no evidence of choledocholithiasis  IV abx  Monitor LFTs  Surgical consult  Fever d/t acute cholecystitis; RVP, UA & CXR neg  Empiric abx  Follow-up cultures   Leukocytosis, improving   Monitor counts   Acute kidney injury on chronic kidney  disease  Metabolic acidosis  IVF  Monitor UOP, creatinine and electrolytes  Nephrology consult   Anemia, mixed iron deficiency with chronic disease  Monitor   CAD sp CABG  Dyslipidemia  Crestor  Diabetes mellitus, A1c 8.2, elevated BG d/t IVF  Correctional scale 1:30  Hypothyroidism  Synthroid     Supplementary Documentation:   Quality:  DVT Prophylaxis: Heparin - hold     At this point Mr. Enamorado is expected to be discharge to: Home    Plan of care discussed with patient, family, RN and GI APN.    Mike Parra MD

## 2024-04-30 NOTE — PLAN OF CARE
Patient is alert and oriented, Marathi speaking. Still having fevers overnight, hospitalist notified. Blood culture and urine clt results still pending. Tylenol given for fever. Patient denies pain. No BM overnights. IVF infusing, all meds given per MAR. Safety precautions in place, call light within reach, plan of care ongoing.     Problem: GASTROINTESTINAL - ADULT  Goal: Minimal or absence of nausea and vomiting  Description: INTERVENTIONS:  - Maintain adequate hydration with IV or PO as ordered and tolerated  - Nasogastric tube to low intermittent suction as ordered  - Evaluate effectiveness of ordered antiemetic medications  - Provide nonpharmacologic comfort measures as appropriate  - Advance diet as tolerated, if ordered  - Obtain nutritional consult as needed  - Evaluate fluid balance  Outcome: Progressing     Problem: METABOLIC/FLUID AND ELECTROLYTES - ADULT  Goal: Electrolytes maintained within normal limits  Description: INTERVENTIONS:  - Monitor labs and rhythm and assess patient for signs and symptoms of electrolyte imbalances  - Administer electrolyte replacement as ordered  - Monitor response to electrolyte replacements, including rhythm and repeat lab results as appropriate  - Fluid restriction as ordered  - Instruct patient on fluid and nutrition restrictions as appropriate  Outcome: Progressing     Problem: METABOLIC/FLUID AND ELECTROLYTES - ADULT  Goal: Glucose maintained within prescribed range  Description: INTERVENTIONS:  - Monitor Blood Glucose as ordered  - Assess for signs and symptoms of hyperglycemia and hypoglycemia  - Administer ordered medications to maintain glucose within target range  - Assess barriers to adequate nutritional intake and initiate nutrition consult as needed  - Instruct patient on self management of diabetes  Outcome: Not Progressing

## 2024-05-01 PROBLEM — K80.20 CALCULUS OF GALLBLADDER WITHOUT CHOLECYSTITIS WITHOUT OBSTRUCTION: Status: ACTIVE | Noted: 2024-05-01

## 2024-05-01 LAB
ALBUMIN SERPL-MCNC: 1.9 G/DL (ref 3.4–5)
ALBUMIN/GLOB SERPL: 0.5 {RATIO} (ref 1–2)
ALP LIVER SERPL-CCNC: 85 U/L
ALT SERPL-CCNC: 17 U/L
ANION GAP SERPL CALC-SCNC: 8 MMOL/L (ref 0–18)
AST SERPL-CCNC: 15 U/L (ref 15–37)
BASOPHILS # BLD AUTO: 0.03 X10(3) UL (ref 0–0.2)
BASOPHILS NFR BLD AUTO: 0.2 %
BILIRUB SERPL-MCNC: 0.3 MG/DL (ref 0.1–2)
BUN BLD-MCNC: 68 MG/DL (ref 9–23)
CALCIUM BLD-MCNC: 7.3 MG/DL (ref 8.5–10.1)
CHLORIDE SERPL-SCNC: 106 MMOL/L (ref 98–112)
CO2 SERPL-SCNC: 24 MMOL/L (ref 21–32)
CREAT BLD-MCNC: 7.21 MG/DL
EGFRCR SERPLBLD CKD-EPI 2021: 7 ML/MIN/1.73M2 (ref 60–?)
EOSINOPHIL # BLD AUTO: 0.02 X10(3) UL (ref 0–0.7)
EOSINOPHIL NFR BLD AUTO: 0.2 %
ERYTHROCYTE [DISTWIDTH] IN BLOOD BY AUTOMATED COUNT: 12.3 %
GLOBULIN PLAS-MCNC: 4.1 G/DL (ref 2.8–4.4)
GLUCOSE BLD-MCNC: 166 MG/DL (ref 70–99)
GLUCOSE BLD-MCNC: 169 MG/DL (ref 70–99)
GLUCOSE BLD-MCNC: 175 MG/DL (ref 70–99)
GLUCOSE BLD-MCNC: 212 MG/DL (ref 70–99)
GLUCOSE BLD-MCNC: 246 MG/DL (ref 70–99)
HCT VFR BLD AUTO: 23.5 %
HGB BLD-MCNC: 7.7 G/DL
IMM GRANULOCYTES # BLD AUTO: 0.13 X10(3) UL (ref 0–1)
IMM GRANULOCYTES NFR BLD: 1.1 %
LYMPHOCYTES # BLD AUTO: 0.9 X10(3) UL (ref 1–4)
LYMPHOCYTES NFR BLD AUTO: 7.3 %
MAGNESIUM SERPL-MCNC: 1.9 MG/DL (ref 1.6–2.6)
MCH RBC QN AUTO: 29.4 PG (ref 26–34)
MCHC RBC AUTO-ENTMCNC: 32.8 G/DL (ref 31–37)
MCV RBC AUTO: 89.7 FL
MONOCYTES # BLD AUTO: 0.96 X10(3) UL (ref 0.1–1)
MONOCYTES NFR BLD AUTO: 7.8 %
NEUTROPHILS # BLD AUTO: 10.23 X10 (3) UL (ref 1.5–7.7)
NEUTROPHILS # BLD AUTO: 10.23 X10(3) UL (ref 1.5–7.7)
NEUTROPHILS NFR BLD AUTO: 83.4 %
OSMOLALITY SERPL CALC.SUM OF ELEC: 310 MOSM/KG (ref 275–295)
PLATELET # BLD AUTO: 201 10(3)UL (ref 150–450)
POTASSIUM SERPL-SCNC: 4.1 MMOL/L (ref 3.5–5.1)
PROT SERPL-MCNC: 6 G/DL (ref 6.4–8.2)
RBC # BLD AUTO: 2.62 X10(6)UL
SODIUM SERPL-SCNC: 138 MMOL/L (ref 136–145)
WBC # BLD AUTO: 12.3 X10(3) UL (ref 4–11)

## 2024-05-01 PROCEDURE — 99233 SBSQ HOSP IP/OBS HIGH 50: CPT | Performed by: INTERNAL MEDICINE

## 2024-05-01 PROCEDURE — 99223 1ST HOSP IP/OBS HIGH 75: CPT | Performed by: STUDENT IN AN ORGANIZED HEALTH CARE EDUCATION/TRAINING PROGRAM

## 2024-05-01 PROCEDURE — 99233 SBSQ HOSP IP/OBS HIGH 50: CPT | Performed by: HOSPITALIST

## 2024-05-01 RX ORDER — METRONIDAZOLE 500 MG/100ML
500 INJECTION, SOLUTION INTRAVENOUS EVERY 8 HOURS
Status: DISCONTINUED | OUTPATIENT
Start: 2024-05-01 | End: 2024-05-04

## 2024-05-01 NOTE — CONSULTS
[facility]  Report of Consultation    Elliott Enamorado Patient Status:  Inpatient    1950 MRN [id number]   Location [facility] 4NW-A Attending Mike Parra MD   Hosp Day # 2 PCP No primary care provider on file.     Requesting Physician:  Patient was seen at the request of Dr. Parra    Reason for Consultation:  Possible cholecystitis, abnormal MRCP    Chief Complaint:  Vomiting    History of Present Illness:  Elliott Enamorado is a 73 year old male with past medical history of CAD, dyslipidemia, hypertension, diabetes, CKD, who presented to [facility] emergency department on 2024 with nausea, vomiting, fevers.  Video language line  was used.  Patient reports he had 2 to 3 days of multiple episodes of emesis, which were described as coffee-ground in appearance.  He also reports fevers of up to 102.  States that he has had 2 bowel movements that were mixed with large amount of red blood.  Denied any significant abdominal pain.    Upon presentation to the emergency department, patient was hemodynamically stable, with a temp of 99.6.  CMP notable for creatinine of 8.48, CO2 18, glucose 227.  Alk phos mildly elevated at 134, remainder of LFTs within normal limits.  CBC notable for leukocytosis of 16.8, hemoglobin 9.5, platelets 239.  Lipase mildly elevated at 97.  CT abdomen pelvis was obtained, which revealed a 2 mm calcification in the distal common bile duct, may represent choledocholithiasis, though without any significant biliary ductal dilation.  Patient was admitted to the hospitalist with GI on consult.    Since admission, patient has not had any recurrence of vomiting, though Hgb did decrease to 7.4. He has had recurrent fever, Tmax 102.9.  MRCP was obtained which did reveal cholelithiasis, as well as trace gallbladder wall thickening and pericholecystic fluid, suggestive of acute cholecystitis.  No common bile duct dilation or evidence of choledocholithiasis was  seen.  Patient currently denies any abdominal pain.  He denies any history of postprandial abdominal pain.  Nephrology is following patient secondary to acute on chronic kidney disease and uremia, recommending HD, though patient is refusing.  GI is also following and plans for endoscopy and likely colonoscopy once medically optimized.  He is receiving PPI IV every 12 hours.    Patient denies any previous abdominal surgeries.    History:  Past Medical History:    CKD (chronic kidney disease)    Diabetes (HCC)    Essential hypertension    HTN (hypertension)    Renal disorder     History reviewed. No pertinent surgical history.  No family history on file.   reports that he has never smoked. He has never been exposed to tobacco smoke. He has never used smokeless tobacco. He reports that he does not drink alcohol and does not use drugs.    Allergies:  Allergies   Allergen Reactions    Ibuprofen RASH    Sulfa Antibiotics RASH       Medications:  Medications Prior to Admission   Medication Sig    febuxostat 40 MG Oral Tab Take 1 tablet (40 mg total) by mouth daily.    levothyroxine 75 MCG Oral Tab Take 1 tablet (75 mcg total) by mouth before breakfast.    tamsulosin 0.4 MG Oral Cap Take 1 capsule (0.4 mg total) by mouth daily.    torsemide 10 MG Oral Tab Take 1 tablet (10 mg total) by mouth daily.    Repaglinide 0.5 MG Oral Tab Take 1 tablet (0.5 mg total) by mouth 3 (three) times daily before meals.    folic acid 1 MG Oral Tab Take 1 tablet (1 mg total) by mouth daily.    ASPIRIN ADULT OR Take 75 mg by mouth daily.    rosuvastatin 10 MG Oral Tab Take 1 tablet (10 mg total) by mouth daily.    ondansetron 4 MG Oral Tablet Dispersible Take 1 tablet (4 mg total) by mouth every 4 (four) hours as needed for Nausea.    amoxicillin clavulanate 250-125 MG Oral Tab Take 1 tablet (250 mg total) by mouth every 12 (twelve) hours.    ondansetron (ZOFRAN) 4 mg tablet Take 1 tablet (4 mg total) by mouth every 8 (eight) hours as needed  for Nausea.         Current Facility-Administered Medications:     metRONIDAZOLE in sodium chloride 0.79% (Flagyl) 5 mg/mL IVPB premix 500 mg, 500 mg, Intravenous, Q8H    iron sucrose (Venofer) 20 mg/mL injection 200 mg, 200 mg, Intravenous, Daily    pantoprazole (Protonix) 40 mg in sodium chloride 0.9% PF 10 mL IV push, 40 mg, Intravenous, Q12H    glucose (Dex4) 15 GM/59ML oral liquid 15 g, 15 g, Oral, Q15 Min PRN **OR** glucose (Glutose) 40% oral gel 15 g, 15 g, Oral, Q15 Min PRN **OR** glucose-vitamin C (Dex-4) chewable tab 4 tablet, 4 tablet, Oral, Q15 Min PRN **OR** dextrose 50% injection 50 mL, 50 mL, Intravenous, Q15 Min PRN **OR** glucose (Dex4) 15 GM/59ML oral liquid 30 g, 30 g, Oral, Q15 Min PRN **OR** glucose (Glutose) 40% oral gel 30 g, 30 g, Oral, Q15 Min PRN **OR** glucose-vitamin C (Dex-4) chewable tab 8 tablet, 8 tablet, Oral, Q15 Min PRN    insulin aspart (NovoLOG) 100 Units/mL FlexPen 1-10 Units, 1-10 Units, Subcutaneous, TID AC and HS    acetaminophen (Tylenol Extra Strength) tab 500 mg, 500 mg, Oral, Q4H PRN    melatonin tab 3 mg, 3 mg, Oral, Nightly PRN    polyethylene glycol (PEG 3350) (Miralax) 17 g oral packet 17 g, 17 g, Oral, Daily PRN    sennosides (Senokot) tab 17.2 mg, 17.2 mg, Oral, Nightly PRN    bisacodyl (Dulcolax) 10 MG rectal suppository 10 mg, 10 mg, Rectal, Daily PRN    ondansetron (Zofran) 4 MG/2ML injection 4 mg, 4 mg, Intravenous, Q6H PRN    metoclopramide (Reglan) 5 mg/mL injection 5 mg, 5 mg, Intravenous, Q8H PRN    [Held by provider] heparin (Porcine) 5000 UNIT/ML injection 5,000 Units, 5,000 Units, Subcutaneous, 2 times per day    folic acid (Folvite) tab 1 mg, 1 mg, Oral, Daily    levothyroxine (Synthroid) tab 75 mcg, 75 mcg, Oral, Before breakfast    rosuvastatin (Crestor) tab 10 mg, 10 mg, Oral, Daily    tamsulosin (Flomax) cap 0.4 mg, 0.4 mg, Oral, Daily    sodium bicarbonate 75 mEq in dextrose 5%-sodium chloride 0.45% 1,075 mL infusion, 75 mEq, Intravenous,  Continuous    cefTRIAXone (Rocephin) 1 g in D5W 100 mL IVPB-ADD, 1 g, Intravenous, Q24H    Review of Systems:  Review of Systems   Constitutional:  Positive for fever. Negative for chills and unexpected weight change.   HENT:  Negative for trouble swallowing.    Respiratory:  Negative for chest tightness and shortness of breath.    Cardiovascular:  Negative for chest pain and leg swelling.   Gastrointestinal:  Positive for nausea, vomiting and blood in stool. Negative for heartburn, abdominal pain, diarrhea, constipation, abdominal distention, anal bleeding and rectal pain.   Genitourinary:  Negative for dysuria, flank pain, scrotal swelling and testicular pain.   Musculoskeletal:  Negative for back pain and neck stiffness.   Skin:  Negative for color change and rash.   Neurological:  Negative for dizziness, weakness and light-headedness.       Physical Exam:  /71 (BP Location: Right arm)   Pulse 59   Temp 98.1 °F (36.7 °C) (Oral)   Resp 18   Ht 65\"   Wt 107 lb 1.6 oz (48.6 kg)   SpO2 92%   BMI 17.82 kg/m²   Physical Exam  Constitutional:       General: He is not in acute distress.     Appearance: Normal appearance. He is not ill-appearing or toxic-appearing.   HENT:      Head: Normocephalic and atraumatic.      Nose: Nose normal.      Mouth/Throat:      Mouth: Mucous membranes are moist.   Eyes:      Conjunctiva/sclera: Conjunctivae normal.   Cardiovascular:      Rate and Rhythm: Normal rate and regular rhythm.   Pulmonary:      Effort: Pulmonary effort is normal. No respiratory distress.   Abdominal:      General: There is no distension.      Palpations: Abdomen is soft.      Tenderness: There is no abdominal tenderness. There is no guarding or rebound.      Hernia: No hernia is present.   Musculoskeletal:         General: No swelling. Normal range of motion.      Cervical back: Neck supple.   Skin:     General: Skin is warm and dry.      Coloration: Skin is not jaundiced.   Neurological:       General: No focal deficit present.      Mental Status: He is alert.   Psychiatric:         Mood and Affect: Mood normal.         Behavior: Behavior normal.         Laboratory Data:  Recent Labs   Lab 04/28/24 2054 04/29/24 0616 04/30/24  0803 05/01/24  0632   RBC 3.20* 2.49* 2.54* 2.62*   HGB 9.5* 7.4* 7.7* 7.7*   HCT 28.7* 23.5* 23.3* 23.5*   MCV 89.7 94.4 91.7 89.7   MCH 29.7 29.7 30.3 29.4   MCHC 33.1 31.5 33.0 32.8   RDW 12.4 12.4 12.5 12.3   NEPRELIM 14.93* 15.43*  --  10.23*   WBC 16.8* 18.6* 14.6* 12.3*   .0 195.0 198.0 201.0       Recent Labs   Lab 04/28/24 2054 04/29/24 0616 04/30/24  0642 05/01/24  0632   * 118* 175* 169*   BUN 97* 90* 84* 68*   CREATSERUM 8.48* 8.04* 7.64* 7.21*   CA 8.5 7.3* 7.4* 7.3*   ALB 2.8* 2.2*  --  1.9*   * 139 139 138   K 5.0 4.9 4.4 4.1    114* 107 106   CO2 18.0* 18.0* 23.0 24.0   ALKPHO 134* 91  --  85   AST 30 12*  --  15   ALT 20 16  --  17   BILT 0.3 0.3  --  0.3   TP 7.8 6.1*  --  6.0*         No results for input(s): \"PTP\", \"INR\", \"PTT\" in the last 168 hours.      MRI MRCP W/3D ONLY (CPT=76376/57411)    Result Date: 4/30/2024  CONCLUSION:  1. Cholelithiasis, trace gallbladder wall thickening and and pericholecystic fluid, suggestive of acute cholecystitis, correlate clinically. 2. No common bile duct dilation or evidence of choledocholithiasis.  Please see above for further details.   LOCATION:  [facility]   Dictated by (CST): Deshawn Pires MD on 4/30/2024 at 11:27 PM     Finalized by (CST): Deshawn Pires MD on 4/30/2024 at 11:32 PM          Nicole Zurita PA-C  5/1/2024  11:43 AM    Is this a shared or split note between Advanced Practice Provider and Physician? Yes       Medical Decision Making         Impression:  Patient Active Problem List   Diagnosis    BIJU (acute kidney injury) (HCC)    CKD (chronic kidney disease) stage 5, GFR less than 15 ml/min (HCC)    Primary hypertension    Acute cholecystitis    Acute renal failure (ARF) (HCC)     BPH (benign prostatic hyperplasia)    Type 2 diabetes mellitus without complication, without long-term current use of insulin (HCC)    Calculus of gallbladder with acute cholecystitis without obstruction    Weakness generalized    Acute renal failure superimposed on stage 5 chronic kidney disease, not on chronic dialysis, unspecified acute renal failure type (HCC)    Anemia, unspecified type    Leukocytosis, unspecified type    Uremia    Metabolic acidosis    Fever       This is a 73-year-old gentleman with MRI imaging concerning for acute cholecystitis    Patient denies any abdominal pain  Patient denies having any abdominal pain prior to the hospital admission  Patient is on a strict diet at home for his worsening kidney function and feels that he no symptoms concerning for gallbladder disease  Patient did have nausea but this is from his uremia and kidney dysfunction  During the hospitalization, patient has been having fever, last was 101 degrees  Patient had ultrasound imaging of his gallbladder which showed cholelithiasis without any evidence of acute cholecystitis  Patient had CT of the abdomen pelvis done showed a small calcification in the common bile duct concerning for choledocholithiasis  Patient subsequently underwent MRCP imaging which demonstrated no choledocholithiasis but gallbladder wall thickening and pericholecystic fluid concerning for acute cholecystitis    No acute surgical intervention  Patient's exam is completely benign, no tenderness in the right upper quadrant  Patient states that he feels hungry on interview    Recommend p.o. challenge with clear liquids if cleared by all other teams  If patient has any nausea, vomiting, or abdominal pain, can be considered for acute cholecystitis picture  Otherwise, patient can be advanced to soft as tolerated    Patient was also found to have GI bleed with coffee-ground emesis and bright red blood with his stool  GI is on board and is planning to scope  the patient once his uremia has been resolved    Surgery will continue to follow  Rest of care per primary    Thank you for letting me participate in the care of this patient  Tracie Gutierrez MD  [facility] General Surgery  5/1/2024  4:54 PM

## 2024-05-01 NOTE — PLAN OF CARE
Pt Aox4. VSS. Afebrile.  No complain of N/V/D.  Up to the bathroom with supervision. No dark stools or bleeding noted.  Clear liquid diet initiated.  Will continue to monitor.      Problem: GASTROINTESTINAL - ADULT  Goal: Minimal or absence of nausea and vomiting  Description: INTERVENTIONS:  - Maintain adequate hydration with IV or PO as ordered and tolerated  - Nasogastric tube to low intermittent suction as ordered  - Evaluate effectiveness of ordered antiemetic medications  - Provide nonpharmacologic comfort measures as appropriate  - Advance diet as tolerated, if ordered  - Obtain nutritional consult as needed  - Evaluate fluid balance  Outcome: Progressing     Problem: METABOLIC/FLUID AND ELECTROLYTES - ADULT  Goal: Glucose maintained within prescribed range  Description: INTERVENTIONS:  - Monitor Blood Glucose as ordered  - Assess for signs and symptoms of hyperglycemia and hypoglycemia  - Administer ordered medications to maintain glucose within target range  - Assess barriers to adequate nutritional intake and initiate nutrition consult as needed  - Instruct patient on self management of diabetes  Outcome: Progressing  Goal: Electrolytes maintained within normal limits  Description: INTERVENTIONS:  - Monitor labs and rhythm and assess patient for signs and symptoms of electrolyte imbalances  - Administer electrolyte replacement as ordered  - Monitor response to electrolyte replacements, including rhythm and repeat lab results as appropriate  - Fluid restriction as ordered  - Instruct patient on fluid and nutrition restrictions as appropriate  Outcome: Progressing

## 2024-05-01 NOTE — PROGRESS NOTES
[facility]  Progress Note    Elliott Enamorado Patient Status:  Inpatient    1950 MRN [id number]   Location [facility] 4NW-A Attending Parag Dennison MD   Hosp Day # 2 PCP No primary care provider on file.     No acute complains  + fever last night  Denies any pain  No n/v      Current Medications:    Current Facility-Administered Medications:     metRONIDAZOLE in sodium chloride 0.79% (Flagyl) 5 mg/mL IVPB premix 500 mg, 500 mg, Intravenous, Q8H    iron sucrose (Venofer) 20 mg/mL injection 200 mg, 200 mg, Intravenous, Daily    pantoprazole (Protonix) 40 mg in sodium chloride 0.9% PF 10 mL IV push, 40 mg, Intravenous, Q12H    glucose (Dex4) 15 GM/59ML oral liquid 15 g, 15 g, Oral, Q15 Min PRN **OR** glucose (Glutose) 40% oral gel 15 g, 15 g, Oral, Q15 Min PRN **OR** glucose-vitamin C (Dex-4) chewable tab 4 tablet, 4 tablet, Oral, Q15 Min PRN **OR** dextrose 50% injection 50 mL, 50 mL, Intravenous, Q15 Min PRN **OR** glucose (Dex4) 15 GM/59ML oral liquid 30 g, 30 g, Oral, Q15 Min PRN **OR** glucose (Glutose) 40% oral gel 30 g, 30 g, Oral, Q15 Min PRN **OR** glucose-vitamin C (Dex-4) chewable tab 8 tablet, 8 tablet, Oral, Q15 Min PRN    insulin aspart (NovoLOG) 100 Units/mL FlexPen 1-10 Units, 1-10 Units, Subcutaneous, TID AC and HS    acetaminophen (Tylenol Extra Strength) tab 500 mg, 500 mg, Oral, Q4H PRN    melatonin tab 3 mg, 3 mg, Oral, Nightly PRN    polyethylene glycol (PEG 3350) (Miralax) 17 g oral packet 17 g, 17 g, Oral, Daily PRN    sennosides (Senokot) tab 17.2 mg, 17.2 mg, Oral, Nightly PRN    bisacodyl (Dulcolax) 10 MG rectal suppository 10 mg, 10 mg, Rectal, Daily PRN    ondansetron (Zofran) 4 MG/2ML injection 4 mg, 4 mg, Intravenous, Q6H PRN    metoclopramide (Reglan) 5 mg/mL injection 5 mg, 5 mg, Intravenous, Q8H PRN    [Held by provider] heparin (Porcine) 5000 UNIT/ML injection 5,000 Units, 5,000 Units, Subcutaneous, 2 times per day    folic acid (Folvite) tab 1 mg, 1 mg, Oral,  Daily    levothyroxine (Synthroid) tab 75 mcg, 75 mcg, Oral, Before breakfast    rosuvastatin (Crestor) tab 10 mg, 10 mg, Oral, Daily    tamsulosin (Flomax) cap 0.4 mg, 0.4 mg, Oral, Daily    sodium bicarbonate 75 mEq in dextrose 5%-sodium chloride 0.45% 1,075 mL infusion, 75 mEq, Intravenous, Continuous    cefTRIAXone (Rocephin) 1 g in D5W 100 mL IVPB-ADD, 1 g, Intravenous, Q24H  Home Medications:  No current outpatient medications on file.       Review of Systems:  See HPI; A total of 12 systems reviewed and otherwise unremarkable.    Physical Exam:  Vital signs: Blood pressure 144/47, pulse 61, temperature 99.3 °F (37.4 °C), temperature source Oral, resp. rate 18, height 5' 5\" (1.651 m), weight 107 lb 1.6 oz (48.6 kg), SpO2 98%.  General: No acute distress. Alert and oriented x 3  HEENT: Moist mucous membranes. EOM-I. PERRL  Neck: No lymphadenopathy.  No JVD. No carotid bruits.  Respiratory: Clear to auscultation bilaterally.  No wheezes. No rhonchi.  Cardiovascular: S1, S2.  Regular rate and rhythm.  No murmurs. Equal pulses   Abdomen: Soft, nontender, nondistended.  Positive bowel sounds. No rebound tenderness   Neurologic: No focal neurological deficits.   Musculoskeletal: Full range of motion of all extremities.  No swelling noted.   Integument: No lesions. No erythema.  Psychiatric: Appropriate mood and affect.  Recent Labs     04/28/24 2054 04/29/24 0616 04/30/24  0803 05/01/24  0632   WBC 16.8* 18.6* 14.6* 12.3*   HGB 9.5* 7.4* 7.7* 7.7*   MCV 89.7 94.4 91.7 89.7   .0 195.0 198.0 201.0       Recent Labs     04/28/24 2054 04/29/24 0616 04/30/24  0642 05/01/24  0632   * 139 139 138   K 5.0 4.9 4.4 4.1    114* 107 106   CO2 18.0* 18.0* 23.0 24.0   BUN 97* 90* 84* 68*   CREATSERUM 8.48* 8.04* 7.64* 7.21*   CA 8.5 7.3* 7.4* 7.3*   MG  --  2.4 2.1 1.9       Recent Labs     04/28/24 2054 04/29/24  0616 05/01/24  0632   ALT 20 16 17   AST 30 12* 15   ALB 2.8* 2.2* 1.9*            Impression:  BIJU/CKD- stg V; related to DM per history. Acute component maybe related to acute febrile illness. He was  mildly volume depleted; improved  + uremic symptoms  - have recommended starting HD ,but pt is hesitant- reviewed w/ son again today ; risk/benefits reviewed. No urgent need fortunately.  - continue fluids  - monitor labs  Acidosis- related to # 1 improved  Hyperkalemia ; improved  N/v- likley component of gastroenteritis + uremia- symptomatic care  Fever; w/u ongoing; cultures NGTD  Anemia due to chronic disease/ckd-IV iron/SHAY  DM        Thank you for allowing me to participate in this patient's care.  Please feel free to call me with any questions or concerns.    Anton Lopez MD  5/1/2024

## 2024-05-01 NOTE — PROGRESS NOTES
Patient alert. Marathi speaking. Son translates. MRI/MRCP done at night time. PRN xanex given before procedure. Fever noted over night. Tylenol given with moderate relief. No complaint of pain. No active bleeding. Call light within reach.

## 2024-05-01 NOTE — PROGRESS NOTES
[facility]  GASTROENTEROLOGY PROGRESS NOTE   [facility]     Elliott Enamorado Patient Status:  Inpatient    1950 MRN [id number]   Location [facility] 4NW-A Attending Mike Parra MD   Hosp Day # 2 PCP No primary care provider on file.       Reason for Consultation:   Coffee ground emesis  hematochezia    Subjective:   No further overt GI bleeding  Denies any n/v or abd pain.       Patient Active Problem List   Diagnosis    BIJU (acute kidney injury) (HCC)    CKD (chronic kidney disease) stage 5, GFR less than 15 ml/min (HCC)    Primary hypertension    Acute cholecystitis    Acute renal failure (ARF) (HCC)    BPH (benign prostatic hyperplasia)    Type 2 diabetes mellitus without complication, without long-term current use of insulin (HCC)    Calculus of gallbladder with acute cholecystitis without obstruction    Weakness generalized    Acute renal failure superimposed on stage 5 chronic kidney disease, not on chronic dialysis, unspecified acute renal failure type (HCC)    Anemia, unspecified type    Leukocytosis, unspecified type    Uremia    Metabolic acidosis    Fever       PMH, PSH, Fhx, Shx as per initial consult note    Review of Systems    12 point Review of Systems negative unless otherwise mentioned in Subjective.     Physical Exam  /71 (BP Location: Right arm)   Pulse 59   Temp 98.1 °F (36.7 °C) (Oral)   Resp 18   Ht 5' 5\" (1.651 m)   Wt 107 lb 1.6 oz (48.6 kg)   SpO2 92%   BMI 17.82 kg/m²   Body mass index is 17.82 kg/m².      Gen: No acute distress  Resp: no respiratory distress  Abd: Soft, non-tender, non-distended. No rebound tenderness, no guarding.   Neuro: Aox3.     Diagnostic Data:      Labs:  Recent Labs   Lab 24  0930 244 24  0616 24  0803 24  0632   WBC 12.1* 16.8* 18.6* 14.6* 12.3*   HGB 10.4* 9.5* 7.4* 7.7* 7.7*   MCV 89.9 89.7 94.4 91.7 89.7   .0 239.0 195.0 198.0 201.0       Recent Labs   Lab  04/28/24 2054 04/29/24 0616 04/30/24  0642 05/01/24  0632   * 118* 175* 169*   BUN 97* 90* 84* 68*   CREATSERUM 8.48* 8.04* 7.64* 7.21*   CA 8.5 7.3* 7.4* 7.3*   ALB 2.8* 2.2*  --  1.9*   * 139 139 138   K 5.0 4.9 4.4 4.1    114* 107 106   CO2 18.0* 18.0* 23.0 24.0   ALKPHO 134* 91  --  85   AST 30 12*  --  15   ALT 20 16  --  17   BILT 0.3 0.3  --  0.3   TP 7.8 6.1*  --  6.0*       No results for input(s): \"PTP\", \"INR\" in the last 168 hours.         No data recorded        Imaging: Imaging data reviewed in Epic.    Medications:    metRONIDAZOLE  500 mg Intravenous Q8H    iron sucrose  200 mg Intravenous Daily    pantoprazole  40 mg Intravenous Q12H    insulin aspart  1-10 Units Subcutaneous TID AC and HS    [Held by provider] heparin  5,000 Units Subcutaneous 2 times per day    folic acid  1 mg Oral Daily    levothyroxine  75 mcg Oral Before breakfast    rosuvastatin  10 mg Oral Daily    tamsulosin  0.4 mg Oral Daily    cefTRIAXone  1 g Intravenous Q24H       Allergies:  Allergies   Allergen Reactions    Ibuprofen RASH    Sulfa Antibiotics RASH       Imaging:  I have personally reviewed all pertinent available imaging.       ASSESSMENT / PLAN:     Elliott Enamorado is a 73 year old male with GI consult for coffee ground emesis, hematochezia. No overt GI bleeding over past 24 hours.      Patient warrants EGD/colonoscopy, however electrolytes/ Ca need to optimized prior to endoscopy with anesthesia. I agree with HD, apprciate renal recommendations. Ongoing discussion w/ fam and son noted - fam does not consent to HD at this time. Hg low, stable.     MRCP no CBD stone. LFTs wnl      Recommendations:   EGD, Colonoscopy once electrolytes corrected - neprology consulted, agree with recommendation for HD.   Monitor for any signs of overt GI bleeding  PPI IV q12 hours  IVF, analgesia, anti-emetics per primary team    Patient's son at bedside, who was involved in discussions / plan of care.   Chris  MD Oswaldo  [facility]

## 2024-05-02 LAB
ALBUMIN SERPL-MCNC: 1.8 G/DL (ref 3.4–5)
ALBUMIN/GLOB SERPL: 0.5 {RATIO} (ref 1–2)
ALP LIVER SERPL-CCNC: 76 U/L
ALT SERPL-CCNC: 12 U/L
ANION GAP SERPL CALC-SCNC: 7 MMOL/L (ref 0–18)
AST SERPL-CCNC: 11 U/L (ref 15–37)
BASOPHILS # BLD AUTO: 0.02 X10(3) UL (ref 0–0.2)
BASOPHILS NFR BLD AUTO: 0.2 %
BILIRUB SERPL-MCNC: 0.2 MG/DL (ref 0.1–2)
BUN BLD-MCNC: 57 MG/DL (ref 9–23)
CALCIUM BLD-MCNC: 6.9 MG/DL (ref 8.5–10.1)
CHLORIDE SERPL-SCNC: 106 MMOL/L (ref 98–112)
CO2 SERPL-SCNC: 29 MMOL/L (ref 21–32)
CREAT BLD-MCNC: 7.04 MG/DL
EGFRCR SERPLBLD CKD-EPI 2021: 8 ML/MIN/1.73M2 (ref 60–?)
EOSINOPHIL # BLD AUTO: 0.02 X10(3) UL (ref 0–0.7)
EOSINOPHIL NFR BLD AUTO: 0.2 %
ERYTHROCYTE [DISTWIDTH] IN BLOOD BY AUTOMATED COUNT: 12.3 %
GLOBULIN PLAS-MCNC: 3.6 G/DL (ref 2.8–4.4)
GLUCOSE BLD-MCNC: 133 MG/DL (ref 70–99)
GLUCOSE BLD-MCNC: 153 MG/DL (ref 70–99)
GLUCOSE BLD-MCNC: 172 MG/DL (ref 70–99)
GLUCOSE BLD-MCNC: 240 MG/DL (ref 70–99)
GLUCOSE BLD-MCNC: 299 MG/DL (ref 70–99)
HCT VFR BLD AUTO: 23.2 %
HGB BLD-MCNC: 7.6 G/DL
IMM GRANULOCYTES # BLD AUTO: 0.12 X10(3) UL (ref 0–1)
IMM GRANULOCYTES NFR BLD: 1.1 %
LYMPHOCYTES # BLD AUTO: 0.82 X10(3) UL (ref 1–4)
LYMPHOCYTES NFR BLD AUTO: 7.8 %
MAGNESIUM SERPL-MCNC: 1.8 MG/DL (ref 1.6–2.6)
MCH RBC QN AUTO: 30.3 PG (ref 26–34)
MCHC RBC AUTO-ENTMCNC: 32.8 G/DL (ref 31–37)
MCV RBC AUTO: 92.4 FL
MONOCYTES # BLD AUTO: 0.82 X10(3) UL (ref 0.1–1)
MONOCYTES NFR BLD AUTO: 7.8 %
NEUTROPHILS # BLD AUTO: 8.65 X10 (3) UL (ref 1.5–7.7)
NEUTROPHILS # BLD AUTO: 8.65 X10(3) UL (ref 1.5–7.7)
NEUTROPHILS NFR BLD AUTO: 82.9 %
OSMOLALITY SERPL CALC.SUM OF ELEC: 313 MOSM/KG (ref 275–295)
PLATELET # BLD AUTO: 190 10(3)UL (ref 150–450)
POTASSIUM SERPL-SCNC: 3.9 MMOL/L (ref 3.5–5.1)
PROT SERPL-MCNC: 5.4 G/DL (ref 6.4–8.2)
RBC # BLD AUTO: 2.51 X10(6)UL
SODIUM SERPL-SCNC: 142 MMOL/L (ref 136–145)
WBC # BLD AUTO: 10.5 X10(3) UL (ref 4–11)

## 2024-05-02 PROCEDURE — 99232 SBSQ HOSP IP/OBS MODERATE 35: CPT | Performed by: STUDENT IN AN ORGANIZED HEALTH CARE EDUCATION/TRAINING PROGRAM

## 2024-05-02 PROCEDURE — 99233 SBSQ HOSP IP/OBS HIGH 50: CPT | Performed by: HOSPITALIST

## 2024-05-02 PROCEDURE — 99233 SBSQ HOSP IP/OBS HIGH 50: CPT | Performed by: INTERNAL MEDICINE

## 2024-05-02 NOTE — CONSULTS
[facility], [facility]  TEL: [phone]  FAX: [phone]    Elliott Enamorado Patient Status:  Inpatient    1950 MRN [id number]   Location [facility] 4NW-A Attending Mike Parra MD   Hosp Day # 3 PCP No primary care provider on file.     Reason for Consultation:  Fever.  Cholecystitis    History of Present Illness:  Ellitot Enamorado is a a(n) 73 year old male.  Robyn speaking gentleman who was interviewed with Robyn speaking  presenting 4 days ago with 2 days of nausea and vomiting with fevers.  Occasional chills.  On presentation, he had episode of coffee-ground emesis.  On presentation to the ED, fevers up to 102.0.  He had 2 bowel movements with red blood.  Otherwise no real diarrhea.  There was initial concern for cholecystitis.  Slight elevation of lipase.  He did have leukocytosis on presentation.  Started on ceftriaxone.  There was a 2 mm calcification in the common bile duct on presentation.  Now status post now status post MRCP which showed Lucille lithiasis with trace gallbladder wall thickening and Rik cholecystic fluid but no common bile duct stone or dilation.    Patient feels much better today.  He continued fevers overnight.  Not febrile this morning.  He denies any nausea.  Would like to eat.    No dysuria or hematuria.  No cough.    History:  Past Medical History:    CKD (chronic kidney disease)    Diabetes (HCC)    Essential hypertension    HTN (hypertension)    Renal disorder     History reviewed. No pertinent surgical history.  No family history on file.   reports that he has never smoked. He has never been exposed to tobacco smoke. He has never used smokeless tobacco. He reports that he does not drink alcohol and does not use drugs.    Allergies:  Allergies   Allergen Reactions    Ibuprofen RASH    Sulfa Antibiotics RASH       Medications:    Current Facility-Administered Medications:     metRONIDAZOLE in sodium chloride 0.79% (Flagyl) 5  mg/mL IVPB premix 500 mg, 500 mg, Intravenous, Q8H    pantoprazole (Protonix) 40 mg in sodium chloride 0.9% PF 10 mL IV push, 40 mg, Intravenous, Q12H    glucose (Dex4) 15 GM/59ML oral liquid 15 g, 15 g, Oral, Q15 Min PRN **OR** glucose (Glutose) 40% oral gel 15 g, 15 g, Oral, Q15 Min PRN **OR** glucose-vitamin C (Dex-4) chewable tab 4 tablet, 4 tablet, Oral, Q15 Min PRN **OR** dextrose 50% injection 50 mL, 50 mL, Intravenous, Q15 Min PRN **OR** glucose (Dex4) 15 GM/59ML oral liquid 30 g, 30 g, Oral, Q15 Min PRN **OR** glucose (Glutose) 40% oral gel 30 g, 30 g, Oral, Q15 Min PRN **OR** glucose-vitamin C (Dex-4) chewable tab 8 tablet, 8 tablet, Oral, Q15 Min PRN    insulin aspart (NovoLOG) 100 Units/mL FlexPen 1-10 Units, 1-10 Units, Subcutaneous, TID AC and HS    acetaminophen (Tylenol Extra Strength) tab 500 mg, 500 mg, Oral, Q4H PRN    melatonin tab 3 mg, 3 mg, Oral, Nightly PRN    polyethylene glycol (PEG 3350) (Miralax) 17 g oral packet 17 g, 17 g, Oral, Daily PRN    sennosides (Senokot) tab 17.2 mg, 17.2 mg, Oral, Nightly PRN    bisacodyl (Dulcolax) 10 MG rectal suppository 10 mg, 10 mg, Rectal, Daily PRN    ondansetron (Zofran) 4 MG/2ML injection 4 mg, 4 mg, Intravenous, Q6H PRN    metoclopramide (Reglan) 5 mg/mL injection 5 mg, 5 mg, Intravenous, Q8H PRN    [Held by provider] heparin (Porcine) 5000 UNIT/ML injection 5,000 Units, 5,000 Units, Subcutaneous, 2 times per day    folic acid (Folvite) tab 1 mg, 1 mg, Oral, Daily    levothyroxine (Synthroid) tab 75 mcg, 75 mcg, Oral, Before breakfast    rosuvastatin (Crestor) tab 10 mg, 10 mg, Oral, Daily    tamsulosin (Flomax) cap 0.4 mg, 0.4 mg, Oral, Daily    sodium bicarbonate 75 mEq in dextrose 5%-sodium chloride 0.45% 1,075 mL infusion, 75 mEq, Intravenous, Continuous    cefTRIAXone (Rocephin) 1 g in D5W 100 mL IVPB-ADD, 1 g, Intravenous, Q24H    Review of Systems:    A comprehensive 10 point review of systems was completed.  Pertinent positives and negatives  noted in the the HPI.    Physical Exam:    General: No acute distress. Alert and oriented x 3.  No respiratory distress.  Vital signs: Blood pressure 135/59, pulse 51, temperature 97.4 °F (36.3 °C), temperature source Oral, resp. rate 16, height 5' 5\" (1.651 m), weight 107 lb 1.6 oz (48.6 kg), SpO2 92%.  HEENT: Moist mucous membranes. Extraocular muscles are intact.  Neck: No lymphadenopathy.  No JVD. No carotid bruits.  Respiratory: Clear to auscultation bilaterally.  No wheezes. No rhonchi.  Cardiovascular: S1, S2.  Regular rate and rhythm.  No murmurs.  Abdomen: Soft, nontender, nondistended.  Positive bowel sounds.  No rebound or guarding.  Musculoskeletal: Full range of motion of all extremities.  No swelling noted.  Integument: No lesions. No erythema.  Psychiatric: Appropriate mood and affect.  Neurologic: No focal neurological deficits.    Laboratory Data:  Lab Results   Component Value Date    WBC 10.5 05/02/2024    HGB 7.6 05/02/2024    HCT 23.2 05/02/2024    .0 05/02/2024    CREATSERUM 7.04 05/02/2024    BUN 57 05/02/2024     05/02/2024    K 3.9 05/02/2024     05/02/2024    CO2 29.0 05/02/2024     05/02/2024    CA 6.9 05/02/2024    ALB 1.8 05/02/2024    ALKPHO 76 05/02/2024    BILT 0.2 05/02/2024    TP 5.4 05/02/2024    AST 11 05/02/2024    ALT 12 05/02/2024    MG 1.8 05/02/2024    PGLU 299 05/02/2024       Microbiologic Data:     Reviewed in EMR    Imaging:  CT Scan and MRI    Imaging results reviewed     Impression:  Patient Active Problem List   Diagnosis    BIJU (acute kidney injury) (HCC)    CKD (chronic kidney disease) stage 5, GFR less than 15 ml/min (HCC)    Primary hypertension    Acute cholecystitis    Acute renal failure (ARF) (HCC)    BPH (benign prostatic hyperplasia)    Type 2 diabetes mellitus without complication, without long-term current use of insulin (HCC)    Calculus of gallbladder with acute cholecystitis without obstruction    Weakness generalized    Acute  renal failure superimposed on stage 5 chronic kidney disease, not on chronic dialysis, unspecified acute renal failure type (HCC)    Anemia, unspecified type    Leukocytosis, unspecified type    Uremia    Metabolic acidosis    Fever    Calculus of gallbladder without cholecystitis without obstruction         ASSESSMENT:    #Acute fever.  Could be from inflammatory changes with cholecystitis.  No bacteremia presently.  Improving with time and on antibiotics with ceftriaxone plus Flagyl.  Blood cultures negative.      #Acute cholecystitis.  Initial concern for stone in the gallbladder neck but MRCP does not show any dilatation or blockage.  No signs of cholangitis presently.    #CKD with BIJU.    PLAN:  Maintain ceftriaxone and Flagyl least overnight.  Follow-up for any further fevers.  Monitor for any signs of phlebitis which can cause sudden onset of fever and hospitalize patient.  Continue to trend white count which has improved and normalized.  Follow creatinine.  If he remains afebrile, eventual possible transition to a short course of oral antibiotic therapy in the outpatient setting.    Thank you for allowing me to participate in the care of your patient.    Kendrick Caldwell MD  5/2/2024  3:30 PM

## 2024-05-02 NOTE — PROGRESS NOTES
Pt is alert and oriented speaks no english. Pt has ivf running and has no complaints of pain. Bed alarm on and pt instructed on importance of call light. Safety measures in place.

## 2024-05-02 NOTE — PROGRESS NOTES
[facility]  Progress Note    Elliott Enamorado Patient Status:  Inpatient    1950 MRN [id number]   Location [facility] 4NW-A Attending Parag Dennison MD   Hosp Day # 3 PCP No primary care provider on file.     No acute complains  States he feels well; noted fever again       Current Medications:    Current Facility-Administered Medications:     metRONIDAZOLE in sodium chloride 0.79% (Flagyl) 5 mg/mL IVPB premix 500 mg, 500 mg, Intravenous, Q8H    pantoprazole (Protonix) 40 mg in sodium chloride 0.9% PF 10 mL IV push, 40 mg, Intravenous, Q12H    glucose (Dex4) 15 GM/59ML oral liquid 15 g, 15 g, Oral, Q15 Min PRN **OR** glucose (Glutose) 40% oral gel 15 g, 15 g, Oral, Q15 Min PRN **OR** glucose-vitamin C (Dex-4) chewable tab 4 tablet, 4 tablet, Oral, Q15 Min PRN **OR** dextrose 50% injection 50 mL, 50 mL, Intravenous, Q15 Min PRN **OR** glucose (Dex4) 15 GM/59ML oral liquid 30 g, 30 g, Oral, Q15 Min PRN **OR** glucose (Glutose) 40% oral gel 30 g, 30 g, Oral, Q15 Min PRN **OR** glucose-vitamin C (Dex-4) chewable tab 8 tablet, 8 tablet, Oral, Q15 Min PRN    insulin aspart (NovoLOG) 100 Units/mL FlexPen 1-10 Units, 1-10 Units, Subcutaneous, TID AC and HS    acetaminophen (Tylenol Extra Strength) tab 500 mg, 500 mg, Oral, Q4H PRN    melatonin tab 3 mg, 3 mg, Oral, Nightly PRN    polyethylene glycol (PEG 3350) (Miralax) 17 g oral packet 17 g, 17 g, Oral, Daily PRN    sennosides (Senokot) tab 17.2 mg, 17.2 mg, Oral, Nightly PRN    bisacodyl (Dulcolax) 10 MG rectal suppository 10 mg, 10 mg, Rectal, Daily PRN    ondansetron (Zofran) 4 MG/2ML injection 4 mg, 4 mg, Intravenous, Q6H PRN    metoclopramide (Reglan) 5 mg/mL injection 5 mg, 5 mg, Intravenous, Q8H PRN    [Held by provider] heparin (Porcine) 5000 UNIT/ML injection 5,000 Units, 5,000 Units, Subcutaneous, 2 times per day    folic acid (Folvite) tab 1 mg, 1 mg, Oral, Daily    levothyroxine (Synthroid) tab 75 mcg, 75 mcg, Oral, Before breakfast     rosuvastatin (Crestor) tab 10 mg, 10 mg, Oral, Daily    tamsulosin (Flomax) cap 0.4 mg, 0.4 mg, Oral, Daily    sodium bicarbonate 75 mEq in dextrose 5%-sodium chloride 0.45% 1,075 mL infusion, 75 mEq, Intravenous, Continuous    cefTRIAXone (Rocephin) 1 g in D5W 100 mL IVPB-ADD, 1 g, Intravenous, Q24H  Home Medications:  No current outpatient medications on file.       Review of Systems:  See HPI; A total of 12 systems reviewed and otherwise unremarkable.    Physical Exam:  Vital signs: Blood pressure 134/74, pulse 57, temperature 98.2 °F (36.8 °C), temperature source Oral, resp. rate 12, height 5' 5\" (1.651 m), weight 107 lb 1.6 oz (48.6 kg), SpO2 92%.  General: No acute distress. Alert and oriented x 3  HEENT: Moist mucous membranes. EOM-I. PERRL  Neck: No lymphadenopathy.  No JVD. No carotid bruits.  Respiratory: Clear to auscultation bilaterally.  No wheezes. No rhonchi.  Cardiovascular: S1, S2.  Regular rate and rhythm.  No murmurs. Equal pulses   Abdomen: Soft, nontender, nondistended.  Positive bowel sounds. No rebound tenderness   Neurologic: No focal neurological deficits.   Musculoskeletal: Full range of motion of all extremities.  No swelling noted.   Integument: No lesions. No erythema.  Psychiatric: Appropriate mood and affect.  Recent Labs     04/30/24  0803 05/01/24  0632 05/02/24  0641   WBC 14.6* 12.3* 10.5   HGB 7.7* 7.7* 7.6*   MCV 91.7 89.7 92.4   .0 201.0 190.0       Recent Labs     04/30/24  0642 05/01/24  0632 05/02/24  0641    138 142   K 4.4 4.1 3.9    106 106   CO2 23.0 24.0 29.0   BUN 84* 68* 57*   CREATSERUM 7.64* 7.21* 7.04*   CA 7.4* 7.3* 6.9*   MG 2.1 1.9 1.8       Recent Labs     05/01/24  0632 05/02/24  0641   ALT 17 12*   AST 15 11*   ALB 1.9* 1.8*       Impression:  BIJU/CKD- stg V; related to DM per history. Acute component maybe related to acute febrile illness. He was  mildly volume depleted; improved  + uremic symptoms  - have recommended starting HD ,but pt is  hesitant- reviewed w/ son again today ; risk/benefits reviewed. No urgent need fortunately.  - fluids  - monitor labs  Acidosis- related to # 1 improved  Hyperkalemia ; improved  N/v- likley component of gastroenteritis + uremia- symptomatic care  Fever; w/u ongoing; cultures NGTD  Anemia due to chronic disease/ckd-IV iron/SHAY  DM        Thank you for allowing me to participate in this patient's care.  Please feel free to call me with any questions or concerns.    Anton Lopez MD  5/2/2024

## 2024-05-02 NOTE — PLAN OF CARE
Pt A&Ox4, Robyn speaking only. Used mobile translation service multiple times today to communicate with pt and encouraged providers to do the same. Fever early this morning on previous shift; no fevers this shift. No c/o pain or nausea today. Appetite very poor; pt and son declined offer to place dietician consult. Pt tolerated all medications without complication. IV sodium bicarb infusing; insulin given per MAR.  Pt and son still declining HD and GI procedures. Implications discussed with pt and son. Call light within reach, fall and safety precautions in place.     Problem: GASTROINTESTINAL - ADULT  Goal: Minimal or absence of nausea and vomiting  Description: INTERVENTIONS:  - Maintain adequate hydration with IV or PO as ordered and tolerated  - Nasogastric tube to low intermittent suction as ordered  - Evaluate effectiveness of ordered antiemetic medications  - Provide nonpharmacologic comfort measures as appropriate  - Advance diet as tolerated, if ordered  - Obtain nutritional consult as needed  - Evaluate fluid balance  Outcome: Progressing     Problem: METABOLIC/FLUID AND ELECTROLYTES - ADULT  Goal: Glucose maintained within prescribed range  Description: INTERVENTIONS:  - Monitor Blood Glucose as ordered  - Assess for signs and symptoms of hyperglycemia and hypoglycemia  - Administer ordered medications to maintain glucose within target range  - Assess barriers to adequate nutritional intake and initiate nutrition consult as needed  - Instruct patient on self management of diabetes  Outcome: Not Progressing  Goal: Electrolytes maintained within normal limits  Description: INTERVENTIONS:  - Monitor labs and rhythm and assess patient for signs and symptoms of electrolyte imbalances  - Administer electrolyte replacement as ordered  - Monitor response to electrolyte replacements, including rhythm and repeat lab results as appropriate  - Fluid restriction as ordered  - Instruct patient on fluid and nutrition  restrictions as appropriate  Outcome: Not Progressing

## 2024-05-02 NOTE — PROGRESS NOTES
[facility]   part of [facility]     Hospitalist Progress Note     Elliott Enamorado Patient Status:  Inpatient    1950 MRN [id number]   Location [facility] 4NW-A Attending Parag Dennison MD   Hosp Day # 3 PCP No primary care provider on file.     Chief Complaint: BIJU on CKD    Subjective:   Patient denies cough. No dysuria. No diarrhea. No abdominal pain. Feels well. Wants to go home.     Current medications:   metRONIDAZOLE  500 mg Intravenous Q8H    pantoprazole  40 mg Intravenous Q12H    insulin aspart  1-10 Units Subcutaneous TID AC and HS    [Held by provider] heparin  5,000 Units Subcutaneous 2 times per day    folic acid  1 mg Oral Daily    levothyroxine  75 mcg Oral Before breakfast    rosuvastatin  10 mg Oral Daily    tamsulosin  0.4 mg Oral Daily    cefTRIAXone  1 g Intravenous Q24H       Objective:    Review of Systems:   10 point ROS completed and was negative, except for pertinent positive and negatives stated in subjective.    Vital signs:  Temp:  [97.4 °F (36.3 °C)-102.6 °F (39.2 °C)] 98.2 °F (36.8 °C)  Pulse:  [57-78] 57  Resp:  [12-18] 12  BP: (126-159)/(50-77) 134/74  SpO2:  [92 %-98 %] 92 %  Patient Weight for the past 72 hrs:   Weight   24 112 lb (50.8 kg)   24 0011 107 lb (48.5 kg)     Physical Exam:    General: No acute distress.   Respiratory: Clear to auscultation bilaterally.   Cardiovascular: S1, S2. Regular rate and rhythm.   Abdomen: Soft, nontender, nondistended.  Positive bowel sounds.   Extremities: No edema. No calf tenderenss.  Neuro: AAOx3    Diagnostic Data:    Labs:  Recent Labs   Lab 24  0616 24  0803 24  0632 24  0641   WBC 16.8* 18.6* 14.6* 12.3* 10.5   HGB 9.5* 7.4* 7.7* 7.7* 7.6*   MCV 89.7 94.4 91.7 89.7 92.4   .0 195.0 198.0 201.0 190.0       Recent Labs   Lab 24  0616 24  0642 24  0632 24  0641   * 175* 169* 153*   BUN 90* 84* 68* 57*   CREATSERUM 8.04* 7.64*  7.21* 7.04*   CA 7.3* 7.4* 7.3* 6.9*   ALB 2.2*  --  1.9* 1.8*    139 138 142   K 4.9 4.4 4.1 3.9   * 107 106 106   CO2 18.0* 23.0 24.0 29.0   ALKPHO 91  --  85 76   AST 12*  --  15 11*   ALT 16  --  17 12*   BILT 0.3  --  0.3 0.2   TP 6.1*  --  6.0* 5.4*       Estimated Creatinine Clearance: 6.3 mL/min (A) (based on SCr of 7.04 mg/dL (H)).    No results for input(s): \"PTP\", \"INR\" in the last 168 hours.         COVID-19 Lab Results    COVID-19  Lab Results   Component Value Date    COVID19 Not Detected 04/29/2024    COVID19 Not Detected 04/27/2024    COVID19 Not Detected 08/21/2023       Pro-Calcitonin  No results for input(s): \"PCT\" in the last 168 hours.    Cardiac  No results for input(s): \"TROP\", \"PBNP\" in the last 168 hours.    Creatinine Kinase  No results for input(s): \"CK\" in the last 168 hours.    Inflammatory Markers  Recent Labs   Lab 04/29/24  0616   MELINDA 257.8       No results for input(s): \"TROP\", \"TROPHS\", \"CK\" in the last 168 hours.    Imaging: Imaging data reviewed in Epic.    Medications:    metRONIDAZOLE  500 mg Intravenous Q8H    pantoprazole  40 mg Intravenous Q12H    insulin aspart  1-10 Units Subcutaneous TID AC and HS    [Held by provider] heparin  5,000 Units Subcutaneous 2 times per day    folic acid  1 mg Oral Daily    levothyroxine  75 mcg Oral Before breakfast    rosuvastatin  10 mg Oral Daily    tamsulosin  0.4 mg Oral Daily    cefTRIAXone  1 g Intravenous Q24H       Assessment & Plan:    Vomiting, coffee ground emesis   Rectal bleeding   PPI IV BID  Monitor H&H   Endoscopic evaluation?  GI consult  Fever, etiology? UA, CXR, RVP neg, not felt to have acute cholecystitis, febrile despite empiric abx, BC negative  Empiric abx  Follow-up cultures   ID consult   Cholelithiasis, trace gall bladder wall thickening and pericholecystic fluid suggestive of acute cholecystitis, no evidence of choledocholithiasis, not felt to have acute cholecystitis clinically  Advance diet as  tolerated  IV abx  Monitor LFTs  Surgical consult  Acute kidney injury on chronic kidney disease  Metabolic acidosis  IVF  Monitor UOP, creatinine and electrolytes  Nephrology consult   Anemia, mixed iron deficiency with chronic disease  Monitor   CAD sp CABG  Dyslipidemia  Crestor  Diabetes mellitus, A1c 8.2, elevated BG d/t IVF  Correctional scale 1:30  Hypothyroidism   Synthroid   Leukocytosis, resolved     Supplementary Documentation:   Quality:  DVT Prophylaxis: Heparin - hold     At this point Mr. Enamorado is expected to be discharge to: Home    Plan of care discussed with patient, son and RN.    Mike Parra MD

## 2024-05-02 NOTE — PROGRESS NOTES
[facility]  Progress Note    Elliott Enamorado Patient Status:  Inpatient    1950 MRN [id number]   Location [facility] 4NW-A Attending Mike Parra MD   Hosp Day # 3 PCP No primary care provider on file.     Subjective:  The patient is seen today with assistance of language line .  The patient states that he is doing well today.  Denies nausea or vomiting.  He is tolerating clear liquids.  He denies fever or chills.  He reports bowel movement today.    Objective/Physical Exam:  General: Alert, orientated x3.  Cooperative.  No apparent distress.  Vital Signs:  Blood pressure 134/74, pulse 57, temperature 98.2 °F (36.8 °C), temperature source Oral, resp. rate 12, height 65\", weight 107 lb 1.6 oz (48.6 kg), SpO2 92%.  Wt Readings from Last 3 Encounters:   24 107 lb 1.6 oz (48.6 kg)   24 108 lb (49 kg)   23 117 lb (53.1 kg)     Lungs: No respiratory distress.  Cardiac: Regular rate and rhythm.   Abdomen:  Soft, non distended, non tender, with no rebound or guarding.  No peritoneal signs.   Extremities:  No lower extremity edema noted.        Intake/Output:    Intake/Output Summary (Last 24 hours) at 2024 1104  Last data filed at 2024 0400  Gross per 24 hour   Intake 2039 ml   Output --   Net 2039 ml     I/O last 3 completed shifts:  In: 2873.9 [P.O.:300; I.V.:2573.9]  Out: -   No intake/output data recorded.    Medications:    metRONIDAZOLE  500 mg Intravenous Q8H    pantoprazole  40 mg Intravenous Q12H    insulin aspart  1-10 Units Subcutaneous TID AC and HS    [Held by provider] heparin  5,000 Units Subcutaneous 2 times per day    folic acid  1 mg Oral Daily    levothyroxine  75 mcg Oral Before breakfast    rosuvastatin  10 mg Oral Daily    tamsulosin  0.4 mg Oral Daily    cefTRIAXone  1 g Intravenous Q24H       Labs:  Lab Results   Component Value Date    WBC 10.5 2024    HGB 7.6 2024    HCT 23.2 2024    .0 2024     Lab Results    Component Value Date     05/02/2024    K 3.9 05/02/2024     05/02/2024    CO2 29.0 05/02/2024    BUN 57 05/02/2024    CREATSERUM 7.04 05/02/2024     05/02/2024    CA 6.9 05/02/2024    ALKPHO 76 05/02/2024    ALT 12 05/02/2024    AST 11 05/02/2024    BILT 0.2 05/02/2024    ALB 1.8 05/02/2024    TP 5.4 05/02/2024     No results found for: \"PT\", \"INR\"      Assessment  Patient Active Problem List   Diagnosis    BIJU (acute kidney injury) (HCC)    CKD (chronic kidney disease) stage 5, GFR less than 15 ml/min (HCC)    Primary hypertension    Acute cholecystitis    Acute renal failure (ARF) (HCC)    BPH (benign prostatic hyperplasia)    Type 2 diabetes mellitus without complication, without long-term current use of insulin (HCC)    Calculus of gallbladder with acute cholecystitis without obstruction    Weakness generalized    Acute renal failure superimposed on stage 5 chronic kidney disease, not on chronic dialysis, unspecified acute renal failure type (HCC)    Anemia, unspecified type    Leukocytosis, unspecified type    Uremia    Metabolic acidosis    Fever    Calculus of gallbladder without cholecystitis without obstruction     Coffee-ground emesis and hematochezia  Cholelithiasis  BIJU on chronic kidney disease    Plan:  No plan for urgent or emergent surgical intervention.  GI planning EGD/colonoscopy to evaluate coffee-ground emesis and hematochezia.  Nephrology has been consulted and planning to start hemodialysis.  Diet as per GI.  [facility] general surgery will sign off.  Please call again if needed.  Follow-up with [facility] general surgery as needed      Quality:  DVT Mechanical Prophylaxis:   SCDs,    DVT Pharmacologic Prophylaxis   Medication    [Held by provider] heparin (Porcine) 5000 UNIT/ML injection 5,000 Units              Ila Joshi PA-C  5/2/2024  11:04 AM        ADDENDUM:     Patient was seen and examined by me. I agree with the above note.  Patient ate oatmeal this morning.  He denies any  nausea vomiting or abdominal pain.  He denies any other symptoms.    General: Alert, orientated x3. Cooperative. No apparent distress.  Pulmonary: non labored breathing, effort normal  Abdomen: Soft, non-distended, nontender to palpation  Extremities: warm, no edema or cyanosis        A/P 73 year old male with cholelithiasis     No acute surgical intervention  Patient has denied abdominal pain during his hospitalization  He has no symptoms concerning for gallbladder disease prior to his hospitalization  Patient was able to tolerate diet this morning without any nausea, vomiting, worsening abdominal pain  Recommend continue to advance diet as tolerated  Patient does have cholelithiasis but remains asymptomatic  He can follow-up with [facility] general surgery as needed but currently no clear indication for cholecystectomy  Surgery will sign off at this time  Please page with any questions or concerns, especially in change in patient's condition care per primary     This note was initiated by Ila Joshi PA-C.  The PA saw the patient in conjunction with me. The PA performed a history, exam, and developed the assessment and plan. I agree with the mentioned assessment and plan and have provided further documentation of my own, if necessary.       Tracie Gutierrez MD  [facility] General Surgery  5/2/2024  4:05 PM

## 2024-05-02 NOTE — PROGRESS NOTES
[facility]  GASTROENTEROLOGY PROGRESS NOTE   [facility] Gastroenterology     Elliott Enamorado Patient Status:  Inpatient    1950 MRN [id number]   Location [facility] 4NW-A Attending Mike Parra MD   Hosp Day # 3 PCP No primary care provider on file.       Reason for Consultation:   Coffee ground emesis  hematochezia    Subjective:   No further overt GI bleeding  Denies any n/v or abd pain.   Pt still hesitant to start dialysis.     Patient Active Problem List   Diagnosis    BIJU (acute kidney injury) (HCC)    CKD (chronic kidney disease) stage 5, GFR less than 15 ml/min (HCC)    Primary hypertension    Acute cholecystitis    Acute renal failure (ARF) (HCC)    BPH (benign prostatic hyperplasia)    Type 2 diabetes mellitus without complication, without long-term current use of insulin (HCC)    Calculus of gallbladder with acute cholecystitis without obstruction    Weakness generalized    Acute renal failure superimposed on stage 5 chronic kidney disease, not on chronic dialysis, unspecified acute renal failure type (HCC)    Anemia, unspecified type    Leukocytosis, unspecified type    Uremia    Metabolic acidosis    Fever    Calculus of gallbladder without cholecystitis without obstruction       PMH, PSH, Fhx, Shx as per initial consult note    Review of Systems    12 point Review of Systems negative unless otherwise mentioned in Subjective.     Physical Exam  /59 (BP Location: Left arm)   Pulse 51   Temp 97.4 °F (36.3 °C) (Oral)   Resp 16   Ht 5' 5\" (1.651 m)   Wt 107 lb 1.6 oz (48.6 kg)   SpO2 92%   BMI 17.82 kg/m²   Body mass index is 17.82 kg/m².      Gen: No acute distress  Resp: no respiratory distress  Abd: Soft, non-tender, non-distended. No rebound tenderness, no guarding.   Neuro: Aox3.     Diagnostic Data:      Labs:  Recent Labs   Lab 24  0616 24  0803 24  0632 24  0641   WBC 16.8* 18.6* 14.6* 12.3* 10.5   HGB 9.5* 7.4* 7.7* 7.7* 7.6*    MCV 89.7 94.4 91.7 89.7 92.4   .0 195.0 198.0 201.0 190.0       Recent Labs   Lab 04/29/24  0616 04/30/24  0642 05/01/24  0632 05/02/24  0641   * 175* 169* 153*   BUN 90* 84* 68* 57*   CREATSERUM 8.04* 7.64* 7.21* 7.04*   CA 7.3* 7.4* 7.3* 6.9*   ALB 2.2*  --  1.9* 1.8*    139 138 142   K 4.9 4.4 4.1 3.9   * 107 106 106   CO2 18.0* 23.0 24.0 29.0   ALKPHO 91  --  85 76   AST 12*  --  15 11*   ALT 16  --  17 12*   BILT 0.3  --  0.3 0.2   TP 6.1*  --  6.0* 5.4*       No results for input(s): \"PTP\", \"INR\" in the last 168 hours.         No data recorded        Imaging: Imaging data reviewed in Epic.    Medications:    metRONIDAZOLE  500 mg Intravenous Q8H    pantoprazole  40 mg Intravenous Q12H    insulin aspart  1-10 Units Subcutaneous TID AC and HS    [Held by provider] heparin  5,000 Units Subcutaneous 2 times per day    folic acid  1 mg Oral Daily    levothyroxine  75 mcg Oral Before breakfast    rosuvastatin  10 mg Oral Daily    tamsulosin  0.4 mg Oral Daily    cefTRIAXone  1 g Intravenous Q24H       Allergies:  Allergies   Allergen Reactions    Ibuprofen RASH    Sulfa Antibiotics RASH       Imaging:  I have personally reviewed all pertinent available imaging.       ASSESSMENT / PLAN:     Elliott Enamorado is a 73 year old male with GI consult for coffee ground emesis, hematochezia. No overt GI bleeding over past 24 hours.      Patient warrants EGD/colonoscopy, however electrolytes/ Ca need to optimized prior to endoscopy with anesthesia. I agree with HD, apprciate renal recommendations. Ongoing discussion w/ fam and son noted - fam does not consent to HD at this time. Hg low, stable.     MRCP no CBD stone. LFTs wnl      Recommendations:   EGD, Colonoscopy once electrolytes corrected - neprology consulted, agree with recommendation for HD. Pt still hesitant for GI procedures or HD.   Monitor for any signs of overt GI bleeding  PPI IV q12 hours  IVF, analgesia, anti-emetics per primary  team    Attempted to reach patient's son multiple times, no answer. LVM. D/w RN staff.   Chris Chacon MD  [facility] Gastroenterology

## 2024-05-03 LAB
ALBUMIN SERPL-MCNC: 1.7 G/DL (ref 3.4–5)
ALBUMIN/GLOB SERPL: 0.5 {RATIO} (ref 1–2)
ALP LIVER SERPL-CCNC: 73 U/L
ALT SERPL-CCNC: 12 U/L
ANION GAP SERPL CALC-SCNC: 9 MMOL/L (ref 0–18)
AST SERPL-CCNC: 15 U/L (ref 15–37)
BASOPHILS # BLD AUTO: 0.03 X10(3) UL (ref 0–0.2)
BASOPHILS NFR BLD AUTO: 0.3 %
BILIRUB SERPL-MCNC: 0.2 MG/DL (ref 0.1–2)
BUN BLD-MCNC: 49 MG/DL (ref 9–23)
C DIFF TOX B STL QL: NEGATIVE
CALCIUM BLD-MCNC: 6.7 MG/DL (ref 8.5–10.1)
CHLORIDE SERPL-SCNC: 106 MMOL/L (ref 98–112)
CO2 SERPL-SCNC: 27 MMOL/L (ref 21–32)
CREAT BLD-MCNC: 6.77 MG/DL
EGFRCR SERPLBLD CKD-EPI 2021: 8 ML/MIN/1.73M2 (ref 60–?)
EOSINOPHIL # BLD AUTO: 0.05 X10(3) UL (ref 0–0.7)
EOSINOPHIL NFR BLD AUTO: 0.5 %
ERYTHROCYTE [DISTWIDTH] IN BLOOD BY AUTOMATED COUNT: 12.3 %
GLOBULIN PLAS-MCNC: 3.7 G/DL (ref 2.8–4.4)
GLUCOSE BLD-MCNC: 159 MG/DL (ref 70–99)
GLUCOSE BLD-MCNC: 166 MG/DL (ref 70–99)
GLUCOSE BLD-MCNC: 181 MG/DL (ref 70–99)
GLUCOSE BLD-MCNC: 262 MG/DL (ref 70–99)
GLUCOSE BLD-MCNC: 301 MG/DL (ref 70–99)
HCT VFR BLD AUTO: 23.1 %
HGB BLD-MCNC: 7.6 G/DL
IMM GRANULOCYTES # BLD AUTO: 0.18 X10(3) UL (ref 0–1)
IMM GRANULOCYTES NFR BLD: 2 %
LYMPHOCYTES # BLD AUTO: 1.11 X10(3) UL (ref 1–4)
LYMPHOCYTES NFR BLD AUTO: 12.1 %
MCH RBC QN AUTO: 30.3 PG (ref 26–34)
MCHC RBC AUTO-ENTMCNC: 32.9 G/DL (ref 31–37)
MCV RBC AUTO: 92 FL
MONOCYTES # BLD AUTO: 0.96 X10(3) UL (ref 0.1–1)
MONOCYTES NFR BLD AUTO: 10.4 %
NEUTROPHILS # BLD AUTO: 6.88 X10 (3) UL (ref 1.5–7.7)
NEUTROPHILS # BLD AUTO: 6.88 X10(3) UL (ref 1.5–7.7)
NEUTROPHILS NFR BLD AUTO: 74.7 %
OSMOLALITY SERPL CALC.SUM OF ELEC: 310 MOSM/KG (ref 275–295)
PLATELET # BLD AUTO: 202 10(3)UL (ref 150–450)
POTASSIUM SERPL-SCNC: 3.9 MMOL/L (ref 3.5–5.1)
PROT SERPL-MCNC: 5.4 G/DL (ref 6.4–8.2)
RBC # BLD AUTO: 2.51 X10(6)UL
SODIUM SERPL-SCNC: 142 MMOL/L (ref 136–145)
WBC # BLD AUTO: 9.2 X10(3) UL (ref 4–11)

## 2024-05-03 PROCEDURE — 99232 SBSQ HOSP IP/OBS MODERATE 35: CPT | Performed by: HOSPITALIST

## 2024-05-03 PROCEDURE — 99233 SBSQ HOSP IP/OBS HIGH 50: CPT | Performed by: INTERNAL MEDICINE

## 2024-05-03 NOTE — PROGRESS NOTES
[facility]  Progress Note    Elliott Enamorado Patient Status:  Inpatient    1950 MRN [id number]   Location [facility] 4NW-A Attending Parag Dennison MD   Hosp Day # 4 PCP No primary care provider on file.     No acute complains  + fever to 101 last night       Current Medications:    Current Facility-Administered Medications:     metRONIDAZOLE in sodium chloride 0.79% (Flagyl) 5 mg/mL IVPB premix 500 mg, 500 mg, Intravenous, Q8H    pantoprazole (Protonix) 40 mg in sodium chloride 0.9% PF 10 mL IV push, 40 mg, Intravenous, Q12H    glucose (Dex4) 15 GM/59ML oral liquid 15 g, 15 g, Oral, Q15 Min PRN **OR** glucose (Glutose) 40% oral gel 15 g, 15 g, Oral, Q15 Min PRN **OR** glucose-vitamin C (Dex-4) chewable tab 4 tablet, 4 tablet, Oral, Q15 Min PRN **OR** dextrose 50% injection 50 mL, 50 mL, Intravenous, Q15 Min PRN **OR** glucose (Dex4) 15 GM/59ML oral liquid 30 g, 30 g, Oral, Q15 Min PRN **OR** glucose (Glutose) 40% oral gel 30 g, 30 g, Oral, Q15 Min PRN **OR** glucose-vitamin C (Dex-4) chewable tab 8 tablet, 8 tablet, Oral, Q15 Min PRN    insulin aspart (NovoLOG) 100 Units/mL FlexPen 1-10 Units, 1-10 Units, Subcutaneous, TID AC and HS    acetaminophen (Tylenol Extra Strength) tab 500 mg, 500 mg, Oral, Q4H PRN    melatonin tab 3 mg, 3 mg, Oral, Nightly PRN    polyethylene glycol (PEG 3350) (Miralax) 17 g oral packet 17 g, 17 g, Oral, Daily PRN    sennosides (Senokot) tab 17.2 mg, 17.2 mg, Oral, Nightly PRN    bisacodyl (Dulcolax) 10 MG rectal suppository 10 mg, 10 mg, Rectal, Daily PRN    ondansetron (Zofran) 4 MG/2ML injection 4 mg, 4 mg, Intravenous, Q6H PRN    metoclopramide (Reglan) 5 mg/mL injection 5 mg, 5 mg, Intravenous, Q8H PRN    [Held by provider] heparin (Porcine) 5000 UNIT/ML injection 5,000 Units, 5,000 Units, Subcutaneous, 2 times per day    folic acid (Folvite) tab 1 mg, 1 mg, Oral, Daily    levothyroxine (Synthroid) tab 75 mcg, 75 mcg, Oral, Before breakfast    rosuvastatin  (Crestor) tab 10 mg, 10 mg, Oral, Daily    tamsulosin (Flomax) cap 0.4 mg, 0.4 mg, Oral, Daily    sodium bicarbonate 75 mEq in dextrose 5%-sodium chloride 0.45% 1,075 mL infusion, 75 mEq, Intravenous, Continuous    cefTRIAXone (Rocephin) 1 g in D5W 100 mL IVPB-ADD, 1 g, Intravenous, Q24H  Home Medications:  No current outpatient medications on file.       Review of Systems:  See HPI; A total of 12 systems reviewed and otherwise unremarkable.    Physical Exam:  Vital signs: Blood pressure 134/83, pulse 59, temperature 98.4 °F (36.9 °C), temperature source Oral, resp. rate 18, height 5' 5\" (1.651 m), weight 107 lb 1.6 oz (48.6 kg), SpO2 99%.  General: No acute distress. Alert and oriented x 3  HEENT: Moist mucous membranes. EOM-I. PERRL  Neck: No lymphadenopathy.  No JVD. No carotid bruits.  Respiratory: Clear to auscultation bilaterally.  No wheezes. No rhonchi.  Cardiovascular: S1, S2.  Regular rate and rhythm.  No murmurs. Equal pulses   Abdomen: Soft, nontender, nondistended.  Positive bowel sounds. No rebound tenderness   Neurologic: No focal neurological deficits.   Musculoskeletal: Full range of motion of all extremities.  No swelling noted.   Integument: No lesions. No erythema.  Psychiatric: Appropriate mood and affect.    Recent Labs     05/01/24  0632 05/02/24 0641 05/03/24 0623   WBC 12.3* 10.5 9.2   HGB 7.7* 7.6* 7.6*   MCV 89.7 92.4 92.0   .0 190.0 202.0       Recent Labs     05/01/24  0632 05/02/24  0641 05/03/24 0624    142 142   K 4.1 3.9 3.9    106 106   CO2 24.0 29.0 27.0   BUN 68* 57* 49*   CREATSERUM 7.21* 7.04* 6.77*   CA 7.3* 6.9* 6.7*   MG 1.9 1.8  --        Recent Labs     05/01/24  0632 05/02/24 0641 05/03/24 0624   ALT 17 12* 12*   AST 15 11* 15   ALB 1.9* 1.8* 1.7*       Impression:  BIJU/CKD- stg V; related to DM per history. Acute component maybe related to acute febrile illness. He was  mildly volume depleted; improved  + uremic symptoms  - have recommended  starting HD ,but pt is hesitant- reviewed w/ son again today ; risk/benefits reviewed. No urgent need fortunately.  - fluids  - monitor labs  Acidosis- related to # 1 improved  Hyperkalemia ; improved  N/v- likley component of gastroenteritis + uremia- symptomatic care  Fever; w/u ongoing; cultures NGTD  Anemia due to chronic disease/ckd-IV iron/SHAY  DM        Thank you for allowing me to participate in this patient's care.  Please feel free to call me with any questions or concerns.    Anton Lopez MD  5/3/2024

## 2024-05-03 NOTE — PROGRESS NOTES
INFECTIOUS DISEASE PROGRESS NOTE    Elliott Enamorado Patient Status:  Inpatient    1950 MRN [id number]   Location [facility] 4NW-A Attending Mike Parra MD   Hosp Day # 4 PCP No primary care provider on file.     Antibiotics: Ceftriaxone/Flagyl IV    Subjective:  Patient afebrile. Patient and family declining endoscopy as recommended by GI service. General Surgery signed off the case as no plans for cholecystectomy this admission       Objective:  /83 (BP Location: Right arm)   Pulse 59   Temp 98.4 °F (36.9 °C) (Oral)   Resp 18   Ht 5' 5\" (1.651 m)   Wt 107 lb 1.6 oz (48.6 kg)   SpO2 99%   BMI 17.82 kg/m²     General Appearance:    Alert, cooperative, no distress, appears stated age   Head:    Normocephalic, without obvious abnormality, atraumatic   Eyes:    PERRL, conjunctiva/corneas clear, EOM's intact, fundi     benign, both eyes        Ears:    Normal TM's and external ear canals, both ears   Nose:   Nares normal, septum midline, mucosa normal, no drainage    or sinus tenderness   Throat:   Lips, mucosa, and tongue normal; teeth and gums normal   Neck:   Supple, symmetrical, trachea midline, no adenopathy;        thyroid:  No enlargement/tenderness/nodules; no carotid    bruit or JVD   Back:     Symmetric, no curvature, ROM normal, no CVA tenderness   Lungs:     Clear to auscultation bilaterally, respirations unlabored   Chest wall:    No tenderness or deformity   Heart:    Regular rate and rhythm, S1 and S2 normal, no murmur, rub   or gallop   Abdomen:     Soft, non-tender, bowel sounds active all four quadrants,     no masses, no organomegaly           Extremities:   Extremities normal, atraumatic, no cyanosis or edema   Pulses:   2+ and symmetric all extremities   Skin:   Skin color, texture, turgor normal, no rashes or lesions   Lymph nodes:   Cervical, supraclavicular, and axillary nodes normal   Neurologic:   CNII-XII intact. Normal strength, sensation and reflexes        throughout       Lab Results   Component Value Date    WBC 9.2 05/03/2024    HGB 7.6 05/03/2024    HCT 23.1 05/03/2024    .0 05/03/2024    PGLU 166 05/03/2024       Microbiology    Reviewed in EMR    Radiology    Reviewed in EMR    Impression:  Patient Active Problem List   Diagnosis    BIJU (acute kidney injury) (HCC)    CKD (chronic kidney disease) stage 5, GFR less than 15 ml/min ([facility])    Primary hypertension    Acute cholecystitis    Acute renal failure (ARF) ([facility])    BPH (benign prostatic hyperplasia)    Type 2 diabetes mellitus without complication, without long-term current use of insulin ([facility])    Calculus of gallbladder with acute cholecystitis without obstruction    Weakness generalized    Acute renal failure superimposed on stage 5 chronic kidney disease, not on chronic dialysis, unspecified acute renal failure type ([facility])    Anemia, unspecified type    Leukocytosis, unspecified type    Uremia    Metabolic acidosis    Fever    Calculus of gallbladder without cholecystitis without obstruction       ASSESSMENT:    Choledocholithiasis  Early cholecystitis  Acute febrile illness-improving  Upper GI bleed on admission  Acute on chronic kidney injury  Sulfa allergy     PLAN:    -Continue Ceftriaxone/Flagyl IV  -Upper GI bleed management as per GI and primary services   -BIJU management as per primary service  -May consider transition to oral antimicrobials if there's no fever/positive cultures once patient is ready for discharge     Sebastián Zavala MD  [facility], [facility]

## 2024-05-03 NOTE — PROGRESS NOTES
[facility]   part of [facility]     Hospitalist Progress Note     Elliott Enamorado Patient Status:  Inpatient    1950 MRN [id number]   Location [facility] 4NW-A Attending Parag Dennison MD   Hosp Day # 4 PCP No primary care provider on file.     Chief Complaint: BIJU on CKD    Subjective:   Patient wants to go home. Does not want to eat. He does not walk because he is hooked to IV. No complaints of pain. No nausea or vomiting.     Current medications:   metRONIDAZOLE  500 mg Intravenous Q8H    pantoprazole  40 mg Intravenous Q12H    insulin aspart  1-10 Units Subcutaneous TID AC and HS    [Held by provider] heparin  5,000 Units Subcutaneous 2 times per day    folic acid  1 mg Oral Daily    levothyroxine  75 mcg Oral Before breakfast    rosuvastatin  10 mg Oral Daily    tamsulosin  0.4 mg Oral Daily    cefTRIAXone  1 g Intravenous Q24H       Objective:    Review of Systems:   10 point ROS completed and was negative, except for pertinent positive and negatives stated in subjective.    Vital signs:  Temp:  [97.4 °F (36.3 °C)-101 °F (38.3 °C)] 98.4 °F (36.9 °C)  Pulse:  [51-72] 59  Resp:  [12-18] 18  BP: (123-159)/(41-83) 134/83  SpO2:  [92 %-99 %] 99 %  Patient Weight for the past 72 hrs:   Weight   246 112 lb (50.8 kg)   24 0011 107 lb (48.5 kg)     Physical Exam:    General: No acute distress.   Respiratory: Clear to auscultation bilaterally.   Cardiovascular: S1, S2. Regular rate and rhythm.   Abdomen: Soft, nontender, nondistended.  Positive bowel sounds.   Extremities: No edema. No calf tenderenss.  Neuro: AAOx3    Diagnostic Data:    Labs:  Recent Labs   Lab 24  0616 24  0803 24  0632 24  0641 24  0623   WBC 18.6* 14.6* 12.3* 10.5 9.2   HGB 7.4* 7.7* 7.7* 7.6* 7.6*   MCV 94.4 91.7 89.7 92.4 92.0   .0 198.0 201.0 190.0 202.0       Recent Labs   Lab 24  0632 24  0641 24  0624   * 153* 159*   BUN 68* 57* 49*    CREATSERUM 7.21* 7.04* 6.77*   CA 7.3* 6.9* 6.7*   ALB 1.9* 1.8* 1.7*    142 142   K 4.1 3.9 3.9    106 106   CO2 24.0 29.0 27.0   ALKPHO 85 76 73   AST 15 11* 15   ALT 17 12* 12*   BILT 0.3 0.2 0.2   TP 6.0* 5.4* 5.4*       Estimated Creatinine Clearance: 6.6 mL/min (A) (based on SCr of 6.77 mg/dL (H)).    No results for input(s): \"PTP\", \"INR\" in the last 168 hours.         COVID-19 Lab Results    COVID-19  Lab Results   Component Value Date    COVID19 Not Detected 04/29/2024    COVID19 Not Detected 04/27/2024    COVID19 Not Detected 08/21/2023       Pro-Calcitonin  No results for input(s): \"PCT\" in the last 168 hours.    Cardiac  No results for input(s): \"TROP\", \"PBNP\" in the last 168 hours.    Creatinine Kinase  No results for input(s): \"CK\" in the last 168 hours.    Inflammatory Markers  Recent Labs   Lab 04/29/24  0616   MELINDA 257.8       No results for input(s): \"TROP\", \"TROPHS\", \"CK\" in the last 168 hours.    Imaging: Imaging data reviewed in Epic.    Medications:    metRONIDAZOLE  500 mg Intravenous Q8H    pantoprazole  40 mg Intravenous Q12H    insulin aspart  1-10 Units Subcutaneous TID AC and HS    [Held by provider] heparin  5,000 Units Subcutaneous 2 times per day    folic acid  1 mg Oral Daily    levothyroxine  75 mcg Oral Before breakfast    rosuvastatin  10 mg Oral Daily    tamsulosin  0.4 mg Oral Daily    cefTRIAXone  1 g Intravenous Q24H       Assessment & Plan:    Vomiting, coffee ground emesis   Rectal bleeding   PPI IV BID  Monitor H&H   No plans for endoscopic evaluation at this time   GI consult  Fever, etiology? UA, CXR, RVP neg, not felt to have acute cholecystitis, febrile despite empiric abx, BC negative  Empiric abx  ID consult   Cholelithiasis, trace gall bladder wall thickening and pericholecystic fluid suggestive of acute cholecystitis, no evidence of choledocholithiasis, not felt to have acute cholecystitis clinically  Diet as tolerated  IV abx  Surgical consult  Acute  kidney injury on chronic kidney disease  Metabolic acidosis  IVF  Monitor UOP, creatinine and electrolytes  Nephrology consult   Anemia, mixed iron deficiency with chronic disease  Monitor   CAD sp CABG  Dyslipidemia  Crestor  Diabetes mellitus, A1c 8.2, elevated BG d/t IVF  Correctional scale 1:30  Hypothyroidism   Synthroid   Leukocytosis, resolved     Supplementary Documentation:   Quality:  DVT Prophylaxis: Heparin - resume     At this point Mr. Enamorado is expected to be discharge to: Home    Plan of care discussed with patient, family and RN.    Increase activity, OOB to chair, incentive spirometry.     Mike Parra MD

## 2024-05-03 NOTE — PROGRESS NOTES
[facility]  GASTROENTEROLOGY PROGRESS NOTE   [facility]     Elliott Enamorado Patient Status:  Inpatient    1950 MRN [id number]   Location [facility] 4NW-A Attending Mike Parra MD   Hosp Day # 4 PCP No primary care provider on file.       Reason for Consultation:   Coffee ground emesis  hematochezia    Subjective:   No further overt GI bleeding. He feels well this morning.   He denies any abd pain, nausea or vomiting.     Patient Active Problem List   Diagnosis    BIJU (acute kidney injury) (HCC)    CKD (chronic kidney disease) stage 5, GFR less than 15 ml/min (HCC)    Primary hypertension    Acute cholecystitis    Acute renal failure (ARF) ([facility])    BPH (benign prostatic hyperplasia)    Type 2 diabetes mellitus without complication, without long-term current use of insulin (HCC)    Calculus of gallbladder with acute cholecystitis without obstruction    Weakness generalized    Acute renal failure superimposed on stage 5 chronic kidney disease, not on chronic dialysis, unspecified acute renal failure type (HCC)    Anemia, unspecified type    Leukocytosis, unspecified type    Uremia    Metabolic acidosis    Fever    Calculus of gallbladder without cholecystitis without obstruction       PMH, PSH, Fhx, Shx as per initial consult note    Review of Systems    12 point Review of Systems negative unless otherwise mentioned in Subjective.     Physical Exam  /83 (BP Location: Right arm)   Pulse 59   Temp 98.4 °F (36.9 °C) (Oral)   Resp 18   Ht 5' 5\" (1.651 m)   Wt 107 lb 1.6 oz (48.6 kg)   SpO2 99%   BMI 17.82 kg/m²   Body mass index is 17.82 kg/m².      Gen: No acute distress  Resp: no respiratory distress  Abd: Soft, non-tender, non-distended. No rebound tenderness, no guarding.   Neuro: Aox3.     Diagnostic Data:      Labs:  Recent Labs   Lab 24  0616 24  0803 24  0632 24  0641 24  0623   WBC 18.6* 14.6* 12.3* 10.5 9.2   HGB 7.4* 7.7* 7.7* 7.6*  7.6*   MCV 94.4 91.7 89.7 92.4 92.0   .0 198.0 201.0 190.0 202.0       Recent Labs   Lab 05/01/24  0632 05/02/24  0641 05/03/24  0624   * 153* 159*   BUN 68* 57* 49*   CREATSERUM 7.21* 7.04* 6.77*   CA 7.3* 6.9* 6.7*   ALB 1.9* 1.8* 1.7*    142 142   K 4.1 3.9 3.9    106 106   CO2 24.0 29.0 27.0   ALKPHO 85 76 73   AST 15 11* 15   ALT 17 12* 12*   BILT 0.3 0.2 0.2   TP 6.0* 5.4* 5.4*       No results for input(s): \"PTP\", \"INR\" in the last 168 hours.         No data recorded        Imaging: Imaging data reviewed in Epic.    Medications:    metRONIDAZOLE  500 mg Intravenous Q8H    pantoprazole  40 mg Intravenous Q12H    insulin aspart  1-10 Units Subcutaneous TID AC and HS    [Held by provider] heparin  5,000 Units Subcutaneous 2 times per day    folic acid  1 mg Oral Daily    levothyroxine  75 mcg Oral Before breakfast    rosuvastatin  10 mg Oral Daily    tamsulosin  0.4 mg Oral Daily    cefTRIAXone  1 g Intravenous Q24H       Allergies:  Allergies   Allergen Reactions    Ibuprofen RASH    Sulfa Antibiotics RASH       Imaging:  I have personally reviewed all pertinent available imaging.       I had an extensive discussion with patient and his son, Jordy regarding the indication for EGD and colonoscopy and the associated risks, benefits, and alternatives and potential complications of anesthesia in setting of age and co-morbid conditions. We discussed that without a procedure, a potential GI malignancy, potential treatable or bleeding lesion may be missed, and could lead to morbidity or mortality. Patient and his son BOTH  understands and based on above, does not want EGD or colonoscopy, due to risks of procedure . All questions answered in full.       ASSESSMENT / PLAN:     Elliott Enamorado is a 73 year old male with GI consult for coffee ground emesis, hematochezia. No overt GI bleeding, and Hg noted as above. Patient and his son re-iterated that they do not want or consent to  endoscopy again today, as detailed above. They understand risks of not pursuing endoscopy.       Recommendations:   I advise EGD/colonoscopy - patient's family and patient do not consent as above. They want to pursue conservative management.   Check CBC, transfuse pRBC if Hg < 7 ; IVF, analgesia, anti-emetics per primary team  PPI IV q12 hours  Advise palliative care to help clarify goals of patient's care   SGI OV in 3-4 weeks      GI will signoff, please page with any questions or concerns, or if patient or his family has any questions from GI standpoint.   Chris Chacon MD  [facility]

## 2024-05-03 NOTE — PLAN OF CARE
Patient ambulatory to the bathroom, loose stool, no signs of bleeding noted. Patient is alert and oriented. Fever overnight, Tylenol given for fever. Patient denies pain or SOB. All meds given per MAR. Safety precautions in place, plan of care ongoing.     Problem: GASTROINTESTINAL - ADULT  Goal: Minimal or absence of nausea and vomiting  Description: INTERVENTIONS:  - Maintain adequate hydration with IV or PO as ordered and tolerated  - Nasogastric tube to low intermittent suction as ordered  - Evaluate effectiveness of ordered antiemetic medications  - Provide nonpharmacologic comfort measures as appropriate  - Advance diet as tolerated, if ordered  - Obtain nutritional consult as needed  - Evaluate fluid balance  Outcome: Progressing     Problem: METABOLIC/FLUID AND ELECTROLYTES - ADULT  Goal: Glucose maintained within prescribed range  Description: INTERVENTIONS:  - Monitor Blood Glucose as ordered  - Assess for signs and symptoms of hyperglycemia and hypoglycemia  - Administer ordered medications to maintain glucose within target range  - Assess barriers to adequate nutritional intake and initiate nutrition consult as needed  - Instruct patient on self management of diabetes  Outcome: Progressing  Goal: Electrolytes maintained within normal limits  Description: INTERVENTIONS:  - Monitor labs and rhythm and assess patient for signs and symptoms of electrolyte imbalances  - Administer electrolyte replacement as ordered  - Monitor response to electrolyte replacements, including rhythm and repeat lab results as appropriate  - Fluid restriction as ordered  - Instruct patient on fluid and nutrition restrictions as appropriate  Outcome: Progressing

## 2024-05-03 NOTE — PLAN OF CARE
Pt denies pain, remains afebrile, had loose stool 2x, instructed to save stool the next time for collection.   Problem: GASTROINTESTINAL - ADULT  Goal: Minimal or absence of nausea and vomiting  Description: INTERVENTIONS:  - Maintain adequate hydration with IV or PO as ordered and tolerated  - Nasogastric tube to low intermittent suction as ordered  - Evaluate effectiveness of ordered antiemetic medications  - Provide nonpharmacologic comfort measures as appropriate  - Advance diet as tolerated, if ordered  - Obtain nutritional consult as needed  - Evaluate fluid balance  5/3/2024 1730 by Ekaterina Wagner RN  Outcome: Progressing  5/3/2024 1730 by Ekaterina Wagner, RN  Outcome: Progressing     Problem: METABOLIC/FLUID AND ELECTROLYTES - ADULT  Goal: Glucose maintained within prescribed range  Description: INTERVENTIONS:  - Monitor Blood Glucose as ordered  - Assess for signs and symptoms of hyperglycemia and hypoglycemia  - Administer ordered medications to maintain glucose within target range  - Assess barriers to adequate nutritional intake and initiate nutrition consult as needed  - Instruct patient on self management of diabetes  5/3/2024 1730 by Ekaterina Wagner, RN  Outcome: Progressing  5/3/2024 1730 by Ekaterina Wagner, RN  Outcome: Progressing  Goal: Electrolytes maintained within normal limits  Description: INTERVENTIONS:  - Monitor labs and rhythm and assess patient for signs and symptoms of electrolyte imbalances  - Administer electrolyte replacement as ordered  - Monitor response to electrolyte replacements, including rhythm and repeat lab results as appropriate  - Fluid restriction as ordered  - Instruct patient on fluid and nutrition restrictions as appropriate  5/3/2024 1730 by Ekaterina Wagner, RN  Outcome: Progressing  5/3/2024 1730 by Ekaterina Wagner RN  Outcome: Progressing     Problem: SAFETY ADULT - FALL  Goal: Free from fall injury  Description: INTERVENTIONS:  - Assess pt frequently  for physical needs  - Identify cognitive and physical deficits and behaviors that affect risk of falls.  - [place] fall precautions as indicated by assessment.  - Educate pt/family on patient safety including physical limitations  - Instruct pt to call for assistance with activity based on assessment  - Modify environment to reduce risk of injury  - Provide assistive devices as appropriate  - Consider OT/PT consult to assist with strengthening/mobility  - Encourage toileting schedule  5/3/2024 1730 by Ekaterina Wagner, RN  Outcome: Progressing  5/3/2024 1730 by Ekaterina Wagner, RN  Outcome: Progressing     Problem: PAIN - ADULT  Goal: Verbalizes/displays adequate comfort level or patient's stated pain goal  Description: INTERVENTIONS:  - Encourage pt to monitor pain and request assistance  - Assess pain using appropriate pain scale  - Administer analgesics based on type and severity of pain and evaluate response  - Implement non-pharmacological measures as appropriate and evaluate response  - Consider cultural and social influences on pain and pain management  - Manage/alleviate anxiety  - Utilize distraction and/or relaxation techniques  - Monitor for opioid side effects  - Notify MD/LIP if interventions unsuccessful or patient reports new pain  - Anticipate increased pain with activity and pre-medicate as appropriate  5/3/2024 1730 by Ekaterina Wganer, RN  Outcome: Progressing  5/3/2024 1730 by Ekaterina Wagner, RN  Outcome: Progressing     Problem: RISK FOR INFECTION - ADULT  Goal: Absence of fever/infection during anticipated neutropenic period  Description: INTERVENTIONS  - Monitor WBC  - Administer growth factors as ordered  - Implement neutropenic guidelines  Outcome: Progressing

## 2024-05-04 VITALS
BODY MASS INDEX: 17.85 KG/M2 | DIASTOLIC BLOOD PRESSURE: 54 MMHG | SYSTOLIC BLOOD PRESSURE: 157 MMHG | RESPIRATION RATE: 14 BRPM | HEIGHT: 65 IN | HEART RATE: 56 BPM | OXYGEN SATURATION: 95 % | TEMPERATURE: 98 F | WEIGHT: 107.13 LBS

## 2024-05-04 PROBLEM — N17.0 ATN (ACUTE TUBULAR NECROSIS) (HCC): Status: ACTIVE | Noted: 2024-05-04

## 2024-05-04 LAB
ANION GAP SERPL CALC-SCNC: 5 MMOL/L (ref 0–18)
BUN BLD-MCNC: 41 MG/DL (ref 9–23)
CALCIUM BLD-MCNC: 6.6 MG/DL (ref 8.5–10.1)
CHLORIDE SERPL-SCNC: 103 MMOL/L (ref 98–112)
CO2 SERPL-SCNC: 32 MMOL/L (ref 21–32)
CREAT BLD-MCNC: 6.31 MG/DL
EGFRCR SERPLBLD CKD-EPI 2021: 9 ML/MIN/1.73M2 (ref 60–?)
GLUCOSE BLD-MCNC: 187 MG/DL (ref 70–99)
GLUCOSE BLD-MCNC: 187 MG/DL (ref 70–99)
GLUCOSE BLD-MCNC: 189 MG/DL (ref 70–99)
OSMOLALITY SERPL CALC.SUM OF ELEC: 305 MOSM/KG (ref 275–295)
POTASSIUM SERPL-SCNC: 3.4 MMOL/L (ref 3.5–5.1)
SODIUM SERPL-SCNC: 140 MMOL/L (ref 136–145)

## 2024-05-04 PROCEDURE — 99239 HOSP IP/OBS DSCHRG MGMT >30: CPT | Performed by: HOSPITALIST

## 2024-05-04 PROCEDURE — 99233 SBSQ HOSP IP/OBS HIGH 50: CPT | Performed by: INTERNAL MEDICINE

## 2024-05-04 RX ORDER — AMOXICILLIN AND CLAVULANATE POTASSIUM 875; 125 MG/1; MG/1
875 TABLET, FILM COATED ORAL 2 TIMES DAILY
Qty: 14 TABLET | Refills: 0 | Status: SHIPPED | OUTPATIENT
Start: 2024-05-04 | End: 2024-05-11

## 2024-05-04 RX ORDER — AMOXICILLIN AND CLAVULANATE POTASSIUM 250; 125 MG/1; MG/1
250 TABLET, FILM COATED ORAL EVERY 12 HOURS SCHEDULED
Qty: 14 TABLET | Refills: 0 | Status: SHIPPED | OUTPATIENT
Start: 2024-05-04 | End: 2024-05-04

## 2024-05-04 RX ORDER — AMOXICILLIN AND CLAVULANATE POTASSIUM 250; 125 MG/1; MG/1
250 TABLET, FILM COATED ORAL 2 TIMES DAILY
Qty: 14 TABLET | Refills: 0 | Status: DISCONTINUED | OUTPATIENT
Start: 2024-05-04 | End: 2024-05-04

## 2024-05-04 NOTE — PROGRESS NOTES
INFECTIOUS DISEASE PROGRESS NOTE    Elliott Enamorado Patient Status:  Inpatient    1950 MRN [id number]   Location [facility] 4NW-A Attending Mike Parra MD   Hosp Day # 5 PCP No primary care provider on file.     Antibiotics: Ceftriaxone/Flagyl IV    Subjective:  Patient afebrile for ~33 hours. Patient anxious to go home. Has no abdominal pain. Was able to eat well yesterday, zofran helped with nausea. But today nausea seems worse, did not eat much at all thus far.       Objective:  /55 (BP Location: Left arm)   Pulse 59   Temp 97.9 °F (36.6 °C) (Oral)   Resp 14   Ht 5' 5\" (1.651 m)   Wt 107 lb 1.6 oz (48.6 kg)   SpO2 97%   BMI 17.82 kg/m²     General Appearance:    Alert, cooperative, no distress, appears stated age   Head:    Normocephalic, without obvious abnormality, atraumatic   Eyes:    PERRL, conjunctiva/corneas clear, EOM's intact, fundi     benign, both eyes        Ears:    Normal TM's and external ear canals, both ears   Nose:   Nares normal, septum midline, mucosa normal, no drainage    or sinus tenderness   Throat:   Lips, mucosa, and tongue normal; teeth and gums normal   Neck:   Supple, symmetrical, trachea midline, no adenopathy;        thyroid:  No enlargement/tenderness/nodules; no carotid    bruit or JVD   Back:     Symmetric, no curvature, ROM normal, no CVA tenderness   Lungs:     Clear to auscultation bilaterally, respirations unlabored   Chest wall:    No tenderness or deformity   Heart:    Regular rate and rhythm, S1 and S2 normal, no murmur, rub   or gallop   Abdomen:     Soft, non-tender, bowel sounds active all four quadrants,     no masses, no organomegaly           Extremities:   Extremities normal, atraumatic, no cyanosis or edema   Pulses:   2+ and symmetric all extremities   Skin:   Skin color, texture, turgor normal, no rashes or lesions   Lymph nodes:   Cervical, supraclavicular, and axillary nodes normal   Neurologic:   CNII-XII intact. Normal  strength, sensation and reflexes       throughout       Lab Results   Component Value Date    CREATSERUM 6.31 05/04/2024    BUN 41 05/04/2024     05/04/2024    K 3.4 05/04/2024     05/04/2024    CO2 32.0 05/04/2024     05/04/2024    CA 6.6 05/04/2024    PGLU 187 05/04/2024       Microbiology    Reviewed in EMR    Radiology    Reviewed in EMR    Impression:  Patient Active Problem List   Diagnosis    BIJU (acute kidney injury) ([facility])    CKD (chronic kidney disease) stage 5, GFR less than 15 ml/min ([facility])    Primary hypertension    Acute cholecystitis    Acute renal failure (ARF) ([facility])    BPH (benign prostatic hyperplasia)    Type 2 diabetes mellitus without complication, without long-term current use of insulin ([facility])    Calculus of gallbladder with acute cholecystitis without obstruction    Weakness generalized    Acute renal failure superimposed on stage 5 chronic kidney disease, not on chronic dialysis, unspecified acute renal failure type ([facility])    Anemia, unspecified type    Leukocytosis, unspecified type    Uremia    Metabolic acidosis    Fever    Calculus of gallbladder without cholecystitis without obstruction    ATN (acute tubular necrosis) ([facility])       ASSESSMENT:    Choledocholithiasis  Early cholecystitis with cholelithiasis  Acute febrile illness-improving  Upper GI bleed on admission  Acute on chronic kidney injury  Sulfa allergy     PLAN:    -Patient and family declining endoscopy as recommended by GI service. General Surgery signed off the case as no plans for cholecystectomy this admission   -Patient anxious for discharge, now afebrile for >24hrs.   - in anticipation for discharge can transition to Augmentin 875/125mg PO BID.   - discharge home with Augmentin for 7 more days.   - I discussed with patient and son to monitor for worsening signs / symptoms of infection when home and to seek medical attention should these occur (worsening abdominal pain, fevers, etc)    Ann Worley  PA-C  [facility]

## 2024-05-04 NOTE — PROGRESS NOTES
NURSING DISCHARGE NOTE    Discharged Home via Wheelchair.  Accompanied by Family member and RN  Belongings Taken by patient/family..  Pt dc'd awake, oriented, denied pain, dc instructions given to son, verbalized understanding.

## 2024-05-04 NOTE — PROGRESS NOTES
[facility]   part of [facility]     Hospitalist Progress Note     Elliott Enamorado Patient Status:  Inpatient    1950 MRN [id number]   Location [facility] 4NW-A Attending Parag Dennison MD   Hosp Day # 5 PCP No primary care provider on file.     Chief Complaint: BIJU on CKD    Subjective:   Patient denies new complaints.     Current medications:   metRONIDAZOLE  500 mg Intravenous Q8H    pantoprazole  40 mg Intravenous Q12H    insulin aspart  1-10 Units Subcutaneous TID AC and HS    heparin  5,000 Units Subcutaneous 2 times per day    folic acid  1 mg Oral Daily    levothyroxine  75 mcg Oral Before breakfast    rosuvastatin  10 mg Oral Daily    tamsulosin  0.4 mg Oral Daily    cefTRIAXone  1 g Intravenous Q24H       Objective:    Review of Systems:   10 point ROS completed and was negative, except for pertinent positive and negatives stated in subjective.    Vital signs:  Temp:  [97.9 °F (36.6 °C)-98.4 °F (36.9 °C)] 97.9 °F (36.6 °C)  Pulse:  [55-59] 59  Resp:  [14-18] 14  BP: (132-155)/(46-79) 151/55  SpO2:  [84 %-100 %] 97 %  Patient Weight for the past 72 hrs:   Weight   24 112 lb (50.8 kg)   24 0011 107 lb (48.5 kg)     Physical Exam:    General: No acute distress.   Respiratory: Clear to auscultation bilaterally.   Cardiovascular: S1, S2. Regular rate and rhythm.   Abdomen: Soft, nontender, nondistended.  Positive bowel sounds.   Extremities: No edema. No calf tenderenss.  Neuro: AAOx3    Diagnostic Data:    Labs:  Recent Labs   Lab 24  0616 24  0803 24  0632 24  0641 24  0623   WBC 18.6* 14.6* 12.3* 10.5 9.2   HGB 7.4* 7.7* 7.7* 7.6* 7.6*   MCV 94.4 91.7 89.7 92.4 92.0   .0 198.0 201.0 190.0 202.0       Recent Labs   Lab 24  0632 24  0641 24  0624 24  0610   * 153* 159* 187*   BUN 68* 57* 49* 41*   CREATSERUM 7.21* 7.04* 6.77* 6.31*   CA 7.3* 6.9* 6.7* 6.6*   ALB 1.9* 1.8* 1.7*  --     142 142 140    K 4.1 3.9 3.9 3.4*    106 106 103   CO2 24.0 29.0 27.0 32.0   ALKPHO 85 76 73  --    AST 15 11* 15  --    ALT 17 12* 12*  --    BILT 0.3 0.2 0.2  --    TP 6.0* 5.4* 5.4*  --        Estimated Creatinine Clearance: 7.1 mL/min (A) (based on SCr of 6.31 mg/dL (H)).    No results for input(s): \"PTP\", \"INR\" in the last 168 hours.         COVID-19 Lab Results    COVID-19  Lab Results   Component Value Date    COVID19 Not Detected 04/29/2024    COVID19 Not Detected 04/27/2024    COVID19 Not Detected 08/21/2023       Pro-Calcitonin  No results for input(s): \"PCT\" in the last 168 hours.    Cardiac  No results for input(s): \"TROP\", \"PBNP\" in the last 168 hours.    Creatinine Kinase  No results for input(s): \"CK\" in the last 168 hours.    Inflammatory Markers  Recent Labs   Lab 04/29/24  0616   MELINDA 257.8       No results for input(s): \"TROP\", \"TROPHS\", \"CK\" in the last 168 hours.    Imaging: Imaging data reviewed in Epic.    Medications:    metRONIDAZOLE  500 mg Intravenous Q8H    pantoprazole  40 mg Intravenous Q12H    insulin aspart  1-10 Units Subcutaneous TID AC and HS    heparin  5,000 Units Subcutaneous 2 times per day    folic acid  1 mg Oral Daily    levothyroxine  75 mcg Oral Before breakfast    rosuvastatin  10 mg Oral Daily    tamsulosin  0.4 mg Oral Daily    cefTRIAXone  1 g Intravenous Q24H       Assessment & Plan:    Vomiting, coffee ground emesis   Rectal bleeding   No plans for endoscopic evaluation at this time   GI consult  Fever, etiology? UA, CXR, RVP neg, not felt to have acute cholecystitis, febrile despite empiric abx, BC negative  Empiric abx  ID consult   Cholelithiasis, trace gall bladder wall thickening and pericholecystic fluid suggestive of acute cholecystitis, no evidence of choledocholithiasis, not felt to have acute cholecystitis clinically  Diet as tolerated  IV abx  Surgical consult  Acute kidney injury on chronic kidney disease  Metabolic acidosis  IVF  Monitor UOP, creatinine and  electrolytes  Nephrology consult   Anemia, mixed iron deficiency with chronic disease  Monitor   CAD sp CABG  Dyslipidemia  Crestor  Diabetes mellitus, A1c 8.2, elevated BG d/t IVF  Correctional scale 1:30  Hypothyroidism   Synthroid   Leukocytosis, resolved     Supplementary Documentation:   Quality:  DVT Prophylaxis: Heparin     Home on oral abx today if cleared by all teams.     Mike Parra MD

## 2024-05-04 NOTE — PROGRESS NOTES
[facility]  Nephrology Progress Note    Elliott Enamorado Attending:  Mike Parra MD       Assessment and Plan:    1) BIJU- due to GIB / mild volume depletion; labs improving slowly. No absolute indication for dialysis- follow labs    2) CKD 5- due to longstanding DM- no further w/u    3) Metabolic acidosis- multifactorial; improving    4) Choledocholithiasis / early cholecystitis- on CTX / flagyl per ID    5) Coffee ground emesis / hematochezia- family refusing endoscopy; hgb stable on PPI     6) Anemia- also multifactorial; in part due to CKD + lower Fe stores    DC planning.       Subjective:  Awake notes in good spirits    Physical Exam:   /64 (BP Location: Left arm)   Pulse 57   Temp 98.4 °F (36.9 °C) (Oral)   Resp 18   Ht 5' 5\" (1.651 m)   Wt 107 lb 1.6 oz (48.6 kg)   SpO2 100%   BMI 17.82 kg/m²   Temp (24hrs), Av.3 °F (36.8 °C), Min:98.1 °F (36.7 °C), Max:98.4 °F (36.9 °C)       Intake/Output Summary (Last 24 hours) at 2024 0602  Last data filed at 5/3/2024 1807  Gross per 24 hour   Intake 688 ml   Output --   Net 688 ml     Wt Readings from Last 3 Encounters:   24 107 lb 1.6 oz (48.6 kg)   24 108 lb (49 kg)   23 117 lb (53.1 kg)     General: awake  HEENT: No scleral icterus, MMM  Neck: Supple, no SRAVANI or thyromegaly  Cardiac: Regular rate and rhythm, S1, S2 normal, no murmur or tub  Lungs: Decreased BS at bases bilaterally   Abdomen: Soft, non-tender. + bowel sounds, no palpable organomegaly  Extremities: Without clubbing, cyanosis; no edema  Neurologic: Cranial nerves grossly intact, moving all extremities  Skin: Warm and dry, no rashes       Labs:   Lab Results   Component Value Date    WBC 9.2 2024    HGB 7.6 2024    HCT 23.1 2024    .0 2024    CREATSERUM 6.77 2024    BUN 49 2024     2024    K 3.9 2024     2024    CO2 27.0 2024     2024    CA 6.7 2024    ALB 1.7  05/03/2024    ALKPHO 73 05/03/2024    BILT 0.2 05/03/2024    TP 5.4 05/03/2024    AST 15 05/03/2024    ALT 12 05/03/2024    PGLU 187 05/04/2024       Imaging:  All imaging studies reviewed.    Meds:   Current Facility-Administered Medications   Medication Dose Route Frequency    metRONIDAZOLE in sodium chloride 0.79% (Flagyl) 5 mg/mL IVPB premix 500 mg  500 mg Intravenous Q8H    pantoprazole (Protonix) 40 mg in sodium chloride 0.9% PF 10 mL IV push  40 mg Intravenous Q12H    glucose (Dex4) 15 GM/59ML oral liquid 15 g  15 g Oral Q15 Min PRN    Or    glucose (Glutose) 40% oral gel 15 g  15 g Oral Q15 Min PRN    Or    glucose-vitamin C (Dex-4) chewable tab 4 tablet  4 tablet Oral Q15 Min PRN    Or    dextrose 50% injection 50 mL  50 mL Intravenous Q15 Min PRN    Or    glucose (Dex4) 15 GM/59ML oral liquid 30 g  30 g Oral Q15 Min PRN    Or    glucose (Glutose) 40% oral gel 30 g  30 g Oral Q15 Min PRN    Or    glucose-vitamin C (Dex-4) chewable tab 8 tablet  8 tablet Oral Q15 Min PRN    insulin aspart (NovoLOG) 100 Units/mL FlexPen 1-10 Units  1-10 Units Subcutaneous TID AC and HS    acetaminophen (Tylenol Extra Strength) tab 500 mg  500 mg Oral Q4H PRN    melatonin tab 3 mg  3 mg Oral Nightly PRN    polyethylene glycol (PEG 3350) (Miralax) 17 g oral packet 17 g  17 g Oral Daily PRN    sennosides (Senokot) tab 17.2 mg  17.2 mg Oral Nightly PRN    bisacodyl (Dulcolax) 10 MG rectal suppository 10 mg  10 mg Rectal Daily PRN    ondansetron (Zofran) 4 MG/2ML injection 4 mg  4 mg Intravenous Q6H PRN    metoclopramide (Reglan) 5 mg/mL injection 5 mg  5 mg Intravenous Q8H PRN    heparin (Porcine) 5000 UNIT/ML injection 5,000 Units  5,000 Units Subcutaneous 2 times per day    folic acid (Folvite) tab 1 mg  1 mg Oral Daily    levothyroxine (Synthroid) tab 75 mcg  75 mcg Oral Before breakfast    rosuvastatin (Crestor) tab 10 mg  10 mg Oral Daily    tamsulosin (Flomax) cap 0.4 mg  0.4 mg Oral Daily    sodium bicarbonate 75 mEq in  dextrose 5%-sodium chloride 0.45% 1,075 mL infusion  75 mEq Intravenous Continuous    cefTRIAXone (Rocephin) 1 g in D5W 100 mL IVPB-ADD  1 g Intravenous Q24H         Questions/concerns were discussed with patient and/or family by bedside.          Lake Kaur MD  5/4/2024  6:02 AM

## 2024-05-04 NOTE — PLAN OF CARE
Patient alert and oriented x3. Afebrile. Room air. No SOB. No complaints of pain. No further GI bleed noted so far. Stool specimen sent to LAB to R/O C-diff, came back negative. Maintained on IV antibiotics as ordered. Blood glucose monitored, Insulin coverage given per MAR. Up to the bathroom. Fall precautions in place. Call light in reach. Frequent rounds provided. Needs attended. Will continue Plan of care.     Problem: GASTROINTESTINAL - ADULT  Goal: Minimal or absence of nausea and vomiting  Description: INTERVENTIONS:  - Maintain adequate hydration with IV or PO as ordered and tolerated  - Nasogastric tube to low intermittent suction as ordered  - Evaluate effectiveness of ordered antiemetic medications  - Provide nonpharmacologic comfort measures as appropriate  - Advance diet as tolerated, if ordered  - Obtain nutritional consult as needed  - Evaluate fluid balance  Outcome: Progressing     Problem: METABOLIC/FLUID AND ELECTROLYTES - ADULT  Goal: Glucose maintained within prescribed range  Description: INTERVENTIONS:  - Monitor Blood Glucose as ordered  - Assess for signs and symptoms of hyperglycemia and hypoglycemia  - Administer ordered medications to maintain glucose within target range  - Assess barriers to adequate nutritional intake and initiate nutrition consult as needed  - Instruct patient on self management of diabetes  Outcome: Progressing     Problem: RISK FOR INFECTION - ADULT  Goal: Absence of fever/infection during anticipated neutropenic period  Description: INTERVENTIONS  - Monitor WBC  - Administer growth factors as ordered  - Implement neutropenic guidelines  Outcome: Progressing

## 2024-05-04 NOTE — DISCHARGE SUMMARY
[facility]     Elliott Enamorado Patient Status:  Inpatient    1950 MRN [id number]   Location [facility] 4NW-A Attending Mike Parra MD   Hosp Day # 5 PCP No primary care provider on file.     Date of Admission: 2024  Date of Discharge:   2024    Discharge Disposition: Home or Self Care    Discharge Diagnosis:  Vomiting, coffee ground emesis   Rectal bleeding   Fever, etiology? UA, CXR, RVP neg, not felt to have acute cholecystitis, febrile despite empiric abx, BC negative  Cholelithiasis, trace gall bladder wall thickening and pericholecystic fluid suggestive of acute cholecystitis, no evidence of choledocholithiasis, not felt to have acute cholecystitis clinically  Acute kidney injury on chronic kidney disease  Metabolic acidosis  Anemia, mixed iron deficiency with chronic disease  CAD sp CABG  Dyslipidemia  Diabetes mellitus, A1c 8.2, elevated BG d/t IVF  Hypothyroidism   Leukocytosis, resolved     History of Present Illness: Elliott Enamorado is a 73 year old male with past medical history CKD stage V, hyperlipidemia, diabetes mellitus type 2, CAD, BPH, hypothyroidism presents to hospital today for intractable nausea and vomiting.  Patient actually came into the emergency room yesterday for same evaluation.  He now is feeling more weak and tired.  He thought initially was constipated but had a small bowel movement.  Patient otherwise denies any abdominal pain, chest pain, short of breath, cough, dysuria, urgency, frequency. Of note, he is from [place] but moved here locally recently to be close w/ family.  Pt declined admission to the hospital yesterday.      -S/p IV Zofran, IV fluid bolus, nephrology consult in the emergency room    Brief Synopsis: Patient presented to the ER d/t vomiting. He was found to have BIJU on CKD for which he was admitted with nephrology on consult. HD recommended, but patient and family declined. Course complicated by fevers,  etiology unclear, empiric abx initiated, ID consulted. Fever resolved. Patient had episode of GIB, GI consulted, endoscopic evaluation recommended, but patient/family declined. There was a question of acute cholecystitis, surgery consulted, not felt to have acute cholecystitis clinically. Diet being tolerated.     Home in stable condition.    Lace+ Score: 65  59-90 High Risk  29-58 Medium Risk  0-28   Low Risk  Patient was referred to the [facility].    TCM Follow-Up Recommendation:  LACE > 58: High Risk of readmission after discharge from the hospital.    Discharge Medication List:     Discharge Medications        CONTINUE taking these medications        Instructions Prescription details   ASPIRIN ADULT OR      Take 75 mg by mouth daily.   Refills: 0     febuxostat 40 MG Tabs  Commonly known as: Uloric      Take 1 tablet (40 mg total) by mouth daily.   Refills: 0     folic acid 1 MG Tabs  Commonly known as: Folvite      Take 1 tablet (1 mg total) by mouth daily.   Refills: 0     levothyroxine 75 MCG Tabs  Commonly known as: Synthroid      Take 1 tablet (75 mcg total) by mouth before breakfast.   Refills: 0     Repaglinide 0.5 MG Tabs      Take 1 tablet (0.5 mg total) by mouth 3 (three) times daily before meals.   Refills: 0     rosuvastatin 10 MG Tabs  Commonly known as: Crestor      Take 1 tablet (10 mg total) by mouth daily.   Refills: 0     tamsulosin 0.4 MG Caps  Commonly known as: Flomax      Take 1 capsule (0.4 mg total) by mouth daily.   Refills: 0            STOP taking these medications      ondansetron 4 mg tablet  Commonly known as: Zofran        ondansetron 4 MG Tbdp  Commonly known as: Zofran-ODT               ASK your doctor about these medications        Instructions Prescription details   amoxicillin clavulanate 250-125 MG Tabs  Commonly known as: Augmentin      Take 1 tablet (250 mg total) by mouth every 12 (twelve) hours.   Quantity: 10 tablet  Refills: 0     torsemide 10 MG  Tabs  Commonly known as: DEMADEX      Take 1 tablet (10 mg total) by mouth daily.   Refills: 0              [place]    Follow-up appointment:   Tracie Gutierrez MD   [facility] [address]  [phone]    Schedule an appointment as soon as possible for a visit in 3 week(s)  to discuss gall bladder removal surgery    Appointments for Next 30 Days 2024 - 6/3/2024      None              -----------------------------------------------------------------------------------------------  PATIENT DISCHARGE INSTRUCTIONS: See electronic chart    Mike Parra MD    Total time spent on discharge plannin minutes     The  [place] Cures Act makes medical notes like these available to patients in the interest of transparency. Please be advised this is a medical document. Medical documents are intended to carry relevant information, facts as evident, and the clinical opinion of the practitioner. The medical note is intended as peer to peer communication and may appear blunt or direct. It is written in medical language and may contain abbreviations or verbiage that are unfamiliar.

## 2024-05-06 ENCOUNTER — PATIENT OUTREACH (OUTPATIENT)
Dept: CASE MANAGEMENT | Age: 74
End: 2024-05-06

## 2024-05-06 NOTE — PROGRESS NOTES
[facility]  [facility] contact information provided as well as [facility] office number, [phone].

## 2024-05-07 ENCOUNTER — PATIENT OUTREACH (OUTPATIENT)
Dept: CASE MANAGEMENT | Age: 74
End: 2024-05-07

## 2024-05-07 NOTE — PROGRESS NOTES
Hospital follow up.    Last A1C Value: 8.2% Date: [4/28/2024]    TCC request.    No answer, left a voicemail with callback information.

## 2024-05-07 NOTE — PROGRESS NOTES
[facility]  [facility] contact information provided as well as [facility] office number, [phone].

## 2024-05-08 NOTE — PROGRESS NOTES
Hospital follow up.    Last A1C Value: 8.2% Date: [4/28/2024]    TCC request.    No answer, left a voicemail with callback information.    Closing encounter.

## 2025-06-17 ENCOUNTER — HOSPITAL ENCOUNTER (OUTPATIENT)
Age: 75
Setting detail: OBSERVATION
Discharge: HOME | End: 2025-06-18
Attending: EMERGENCY MEDICINE | Admitting: STUDENT IN AN ORGANIZED HEALTH CARE EDUCATION/TRAINING PROGRAM

## 2025-06-17 ENCOUNTER — APPOINTMENT (OUTPATIENT)
Dept: DIALYSIS | Age: 75
End: 2025-06-17
Attending: INTERNAL MEDICINE

## 2025-06-17 ENCOUNTER — APPOINTMENT (OUTPATIENT)
Dept: GENERAL RADIOLOGY | Age: 75
End: 2025-06-17
Attending: EMERGENCY MEDICINE

## 2025-06-17 DIAGNOSIS — N18.6 ESRD (END STAGE RENAL DISEASE)  (CMD): ICD-10-CM

## 2025-06-17 DIAGNOSIS — R40.4 UNRESPONSIVE EPISODE: Primary | ICD-10-CM

## 2025-06-17 LAB
ALBUMIN SERPL-MCNC: 3.1 G/DL (ref 3.4–5)
ALBUMIN/GLOB SERPL: 0.7 {RATIO} (ref 1–2.4)
ALP SERPL-CCNC: 183 UNITS/L (ref 45–117)
ALT SERPL-CCNC: 30 UNITS/L
ANION GAP SERPL CALC-SCNC: 12 MMOL/L (ref 7–19)
AST SERPL-CCNC: 24 UNITS/L
ATRIAL RATE (BPM): 59
BASOPHILS # BLD: 0 K/MCL (ref 0–0.3)
BASOPHILS NFR BLD: 0 %
BILIRUB SERPL-MCNC: 0.4 MG/DL (ref 0.2–1)
BUN SERPL-MCNC: 85 MG/DL (ref 6–20)
BUN/CREAT SERPL: 12 (ref 7–25)
CALCIUM SERPL-MCNC: 8.4 MG/DL (ref 8.4–10.2)
CHLORIDE SERPL-SCNC: 99 MMOL/L (ref 97–110)
CO2 SERPL-SCNC: 27 MMOL/L (ref 21–32)
CREAT SERPL-MCNC: 7.16 MG/DL (ref 0.67–1.17)
DEPRECATED RDW RBC: 51.8 FL (ref 39–50)
EGFRCR SERPLBLD CKD-EPI 2021: 7 ML/MIN/{1.73_M2}
EOSINOPHIL # BLD: 0.1 K/MCL (ref 0–0.5)
EOSINOPHIL NFR BLD: 2 %
ERYTHROCYTE [DISTWIDTH] IN BLOOD: 16.1 % (ref 11–15)
FASTING DURATION TIME PATIENT: ABNORMAL H
GLOBULIN SER-MCNC: 4.3 G/DL (ref 2–4)
GLUCOSE BLDC GLUCOMTR-MCNC: 134 MG/DL (ref 70–99)
GLUCOSE SERPL-MCNC: 194 MG/DL (ref 70–99)
HBV CORE IGG+IGM SER QL: NEGATIVE
HBV SURFACE AB SER-ACNC: <3.1 MUNITS/ML
HBV SURFACE AG SER QL: NEGATIVE
HCT VFR BLD CALC: 37.1 % (ref 39–51)
HGB BLD-MCNC: 11.6 G/DL (ref 13–17)
IMM GRANULOCYTES # BLD AUTO: 0 K/MCL (ref 0–0.2)
IMM GRANULOCYTES # BLD: 1 %
LYMPHOCYTES # BLD: 1.8 K/MCL (ref 1–4)
LYMPHOCYTES NFR BLD: 26 %
MAGNESIUM SERPL-MCNC: 2.9 MG/DL (ref 1.7–2.4)
MCH RBC QN AUTO: 27.5 PG (ref 26–34)
MCHC RBC AUTO-ENTMCNC: 31.3 G/DL (ref 32–36.5)
MCV RBC AUTO: 87.9 FL (ref 78–100)
MONOCYTES # BLD: 1 K/MCL (ref 0.3–0.9)
MONOCYTES NFR BLD: 14 %
NEUTROPHILS # BLD: 3.9 K/MCL (ref 1.8–7.7)
NEUTROPHILS NFR BLD: 57 %
NRBC BLD MANUAL-RTO: 0 /100 WBC
P AXIS (DEGREES): 34
PLATELET # BLD AUTO: 188 K/MCL (ref 140–450)
POTASSIUM SERPL-SCNC: 4.1 MMOL/L (ref 3.4–5.1)
PR-INTERVAL (MSEC): 204
PROT SERPL-MCNC: 7.4 G/DL (ref 6.4–8.2)
QRS-INTERVAL (MSEC): 92
QT-INTERVAL (MSEC): 434
QTC: 430
R AXIS (DEGREES): -22
RAINBOW EXTRA TUBES HOLD SPECIMEN: NORMAL
RBC # BLD: 4.22 MIL/MCL (ref 4.5–5.9)
REPORT TEXT: NORMAL
SODIUM SERPL-SCNC: 134 MMOL/L (ref 135–145)
T AXIS (DEGREES): 96
TROPONIN I SERPL DL<=0.01 NG/ML-MCNC: 28 NG/L
VENTRICULAR RATE EKG/MIN (BPM): 59
WBC # BLD: 6.8 K/MCL (ref 4.2–11)

## 2025-06-17 PROCEDURE — G0378 HOSPITAL OBSERVATION PER HR: HCPCS

## 2025-06-17 PROCEDURE — 84484 ASSAY OF TROPONIN QUANT: CPT | Performed by: EMERGENCY MEDICINE

## 2025-06-17 PROCEDURE — 86704 HEP B CORE ANTIBODY TOTAL: CPT | Performed by: INTERNAL MEDICINE

## 2025-06-17 PROCEDURE — 93010 ELECTROCARDIOGRAM REPORT: CPT | Performed by: INTERNAL MEDICINE

## 2025-06-17 PROCEDURE — 86706 HEP B SURFACE ANTIBODY: CPT | Performed by: INTERNAL MEDICINE

## 2025-06-17 PROCEDURE — 10004651 HB RX, NO CHARGE ITEM: Performed by: STUDENT IN AN ORGANIZED HEALTH CARE EDUCATION/TRAINING PROGRAM

## 2025-06-17 PROCEDURE — 87340 HEPATITIS B SURFACE AG IA: CPT | Performed by: INTERNAL MEDICINE

## 2025-06-17 PROCEDURE — 80053 COMPREHEN METABOLIC PANEL: CPT | Performed by: EMERGENCY MEDICINE

## 2025-06-17 PROCEDURE — 93005 ELECTROCARDIOGRAM TRACING: CPT | Performed by: EMERGENCY MEDICINE

## 2025-06-17 PROCEDURE — 96372 THER/PROPH/DIAG INJ SC/IM: CPT | Performed by: STUDENT IN AN ORGANIZED HEALTH CARE EDUCATION/TRAINING PROGRAM

## 2025-06-17 PROCEDURE — 83735 ASSAY OF MAGNESIUM: CPT | Performed by: EMERGENCY MEDICINE

## 2025-06-17 PROCEDURE — 85025 COMPLETE CBC W/AUTO DIFF WBC: CPT | Performed by: EMERGENCY MEDICINE

## 2025-06-17 PROCEDURE — 82962 GLUCOSE BLOOD TEST: CPT

## 2025-06-17 PROCEDURE — 10002800 HB RX 250 W HCPCS: Performed by: STUDENT IN AN ORGANIZED HEALTH CARE EDUCATION/TRAINING PROGRAM

## 2025-06-17 PROCEDURE — 99285 EMERGENCY DEPT VISIT HI MDM: CPT | Performed by: EMERGENCY MEDICINE

## 2025-06-17 PROCEDURE — G0257 UNSCHED DIALYSIS ESRD PT HOS: HCPCS

## 2025-06-17 PROCEDURE — 71045 X-RAY EXAM CHEST 1 VIEW: CPT

## 2025-06-17 RX ORDER — HYDRALAZINE HYDROCHLORIDE 20 MG/ML
10 INJECTION INTRAMUSCULAR; INTRAVENOUS EVERY 4 HOURS PRN
Status: DISCONTINUED | OUTPATIENT
Start: 2025-06-17 | End: 2025-06-18 | Stop reason: HOSPADM

## 2025-06-17 RX ORDER — POLYETHYLENE GLYCOL 3350 17 G/17G
17 POWDER, FOR SOLUTION ORAL DAILY PRN
Status: DISCONTINUED | OUTPATIENT
Start: 2025-06-17 | End: 2025-06-18 | Stop reason: HOSPADM

## 2025-06-17 RX ORDER — AMOXICILLIN 250 MG
2 CAPSULE ORAL 2 TIMES DAILY PRN
Status: DISCONTINUED | OUTPATIENT
Start: 2025-06-17 | End: 2025-06-18 | Stop reason: HOSPADM

## 2025-06-17 RX ORDER — SODIUM CITRATE 4 % (5 ML)
3 SYRINGE (ML) MISCELLANEOUS PRN
Status: DISCONTINUED | OUTPATIENT
Start: 2025-06-17 | End: 2025-06-18 | Stop reason: HOSPADM

## 2025-06-17 RX ORDER — ONDANSETRON 4 MG/1
4 TABLET, ORALLY DISINTEGRATING ORAL EVERY 12 HOURS PRN
Status: DISCONTINUED | OUTPATIENT
Start: 2025-06-17 | End: 2025-06-18 | Stop reason: HOSPADM

## 2025-06-17 RX ORDER — 0.9 % SODIUM CHLORIDE 0.9 %
10 VIAL (ML) INJECTION PRN
Status: DISCONTINUED | OUTPATIENT
Start: 2025-06-17 | End: 2025-06-18 | Stop reason: HOSPADM

## 2025-06-17 RX ORDER — ACETAMINOPHEN 325 MG/1
650 TABLET ORAL EVERY 4 HOURS PRN
Status: DISCONTINUED | OUTPATIENT
Start: 2025-06-17 | End: 2025-06-18 | Stop reason: HOSPADM

## 2025-06-17 RX ORDER — 0.9 % SODIUM CHLORIDE 0.9 %
2 VIAL (ML) INJECTION EVERY 12 HOURS SCHEDULED
Status: DISCONTINUED | OUTPATIENT
Start: 2025-06-17 | End: 2025-06-18 | Stop reason: HOSPADM

## 2025-06-17 RX ORDER — ONDANSETRON 2 MG/ML
4 INJECTION INTRAMUSCULAR; INTRAVENOUS EVERY 12 HOURS PRN
Status: DISCONTINUED | OUTPATIENT
Start: 2025-06-17 | End: 2025-06-18 | Stop reason: HOSPADM

## 2025-06-17 RX ORDER — ACETAMINOPHEN 650 MG/1
650 SUPPOSITORY RECTAL EVERY 4 HOURS PRN
Status: DISCONTINUED | OUTPATIENT
Start: 2025-06-17 | End: 2025-06-18 | Stop reason: HOSPADM

## 2025-06-17 RX ORDER — HEPARIN SODIUM 5000 [USP'U]/ML
5000 INJECTION, SOLUTION INTRAVENOUS; SUBCUTANEOUS EVERY 8 HOURS SCHEDULED
Status: DISCONTINUED | OUTPATIENT
Start: 2025-06-17 | End: 2025-06-18 | Stop reason: HOSPADM

## 2025-06-17 RX ORDER — BISACODYL 10 MG
10 SUPPOSITORY, RECTAL RECTAL DAILY PRN
Status: DISCONTINUED | OUTPATIENT
Start: 2025-06-17 | End: 2025-06-18 | Stop reason: HOSPADM

## 2025-06-17 RX ADMIN — HEPARIN SODIUM 5000 UNITS: 5000 INJECTION INTRAVENOUS; SUBCUTANEOUS at 17:36

## 2025-06-17 RX ADMIN — SODIUM CHLORIDE, PRESERVATIVE FREE 2 ML: 5 INJECTION INTRAVENOUS at 20:27

## 2025-06-17 RX ADMIN — HEPARIN SODIUM 5000 UNITS: 5000 INJECTION INTRAVENOUS; SUBCUTANEOUS at 20:31

## 2025-06-17 SDOH — ECONOMIC STABILITY: FOOD INSECURITY: WITHIN THE PAST 12 MONTHS, THE FOOD YOU BOUGHT JUST DIDN'T LAST AND YOU DIDN'T HAVE MONEY TO GET MORE.: NEVER TRUE

## 2025-06-17 SDOH — ECONOMIC STABILITY: GENERAL

## 2025-06-17 SDOH — SOCIAL STABILITY: SOCIAL INSECURITY: HOW OFTEN DOES ANYONE, INCLUDING FAMILY AND FRIENDS, INSULT OR TALK DOWN TO YOU?: NEVER

## 2025-06-17 SDOH — SOCIAL STABILITY: SOCIAL INSECURITY: HOW OFTEN DOES ANYONE, INCLUDING FAMILY AND FRIENDS, PHYSICALLY HURT YOU?: NEVER

## 2025-06-17 SDOH — HEALTH STABILITY: PHYSICAL HEALTH: DO YOU HAVE SERIOUS DIFFICULTY WALKING OR CLIMBING STAIRS?: NO

## 2025-06-17 SDOH — ECONOMIC STABILITY: INCOME INSECURITY: IN THE PAST 12 MONTHS, HAS THE ELECTRIC, GAS, OIL, OR WATER COMPANY THREATENED TO SHUT OFF SERVICE IN YOUR HOME?: NO

## 2025-06-17 SDOH — SOCIAL STABILITY: SOCIAL INSECURITY: HOW OFTEN DOES ANYONE, INCLUDING FAMILY AND FRIENDS, THREATEN YOU WITH HARM?: NEVER

## 2025-06-17 SDOH — SOCIAL STABILITY: SOCIAL INSECURITY: HOW OFTEN DOES ANYONE, INCLUDING FAMILY AND FRIENDS, SCREAM OR CURSE AT YOU?: NEVER

## 2025-06-17 SDOH — ECONOMIC STABILITY: HOUSING INSECURITY: DO YOU HAVE PROBLEMS WITH ANY OF THE FOLLOWING?: NONE OF THE ABOVE

## 2025-06-17 SDOH — ECONOMIC STABILITY: HOUSING INSECURITY: WHAT IS YOUR LIVING SITUATION TODAY?: I HAVE A STEADY PLACE TO LIVE

## 2025-06-17 SDOH — HEALTH STABILITY: GENERAL: BECAUSE OF A PHYSICAL, MENTAL, OR EMOTIONAL CONDITION, DO YOU HAVE DIFFICULTY DOING ERRANDS ALONE?: NO

## 2025-06-17 SDOH — ECONOMIC STABILITY: HOUSING INSECURITY: WHAT IS YOUR LIVING SITUATION TODAY?: HOUSE

## 2025-06-17 SDOH — SOCIAL STABILITY: SOCIAL NETWORK
HOW OFTEN DO YOU SEE OR TALK TO PEOPLE THAT YOU CARE ABOUT AND FEEL CLOSE TO? (FOR EXAMPLE: TALKING TO FRIENDS ON THE PHONE, VISITING FRIENDS OR FAMILY, GOING TO CHURCH OR CLUB MEETINGS): 5 OR MORE TIMES A WEEK

## 2025-06-17 SDOH — HEALTH STABILITY: GENERAL
BECAUSE OF A PHYSICAL, MENTAL, OR EMOTIONAL CONDITION, DO YOU HAVE SERIOUS DIFFICULTY CONCENTRATING, REMEMBERING OR MAKING DECISIONS?: NO

## 2025-06-17 SDOH — SOCIAL STABILITY: SOCIAL NETWORK: SUPPORT SYSTEMS: CHILDREN;FAMILY MEMBERS

## 2025-06-17 SDOH — HEALTH STABILITY: PHYSICAL HEALTH: DO YOU HAVE DIFFICULTY DRESSING OR BATHING?: NO

## 2025-06-17 SDOH — ECONOMIC STABILITY: TRANSPORTATION INSECURITY
IN THE PAST 12 MONTHS, HAS LACK OF RELIABLE TRANSPORTATION KEPT YOU FROM MEDICAL APPOINTMENTS, MEETINGS, WORK OR FROM GETTING THINGS NEEDED FOR DAILY LIVING?: NO

## 2025-06-17 SDOH — ECONOMIC STABILITY: HOUSING INSECURITY: WHAT IS YOUR LIVING SITUATION TODAY?: CHILDREN

## 2025-06-17 ASSESSMENT — LIFESTYLE VARIABLES
HOW OFTEN DO YOU HAVE A DRINK CONTAINING ALCOHOL: NEVER
ALCOHOL_USE_STATUS: NO OR LOW RISK WITH VALIDATED TOOL
AUDIT-C TOTAL SCORE: 0
HOW OFTEN DO YOU HAVE 6 OR MORE DRINKS ON ONE OCCASION: NEVER
HOW MANY STANDARD DRINKS CONTAINING ALCOHOL DO YOU HAVE ON A TYPICAL DAY: 0,1 OR 2

## 2025-06-17 ASSESSMENT — COGNITIVE AND FUNCTIONAL STATUS - GENERAL
DO YOU HAVE SERIOUS DIFFICULTY WALKING OR CLIMBING STAIRS: NO
DO YOU HAVE DIFFICULTY DRESSING OR BATHING: NO
BECAUSE OF A PHYSICAL, MENTAL, OR EMOTIONAL CONDITION, DO YOU HAVE DIFFICULTY DOING ERRANDS ALONE: NO

## 2025-06-17 ASSESSMENT — PAIN SCALES - GENERAL
PAINLEVEL_OUTOF10: 0
PAINLEVEL_OUTOF10: 0

## 2025-06-17 ASSESSMENT — ACTIVITIES OF DAILY LIVING (ADL)
ADL_SCORE: 12
ADL_SHORT_OF_BREATH: NO
ADL_BEFORE_ADMISSION: INDEPENDENT
RECENT_DECLINE_ADL: NO

## 2025-06-17 ASSESSMENT — PATIENT HEALTH QUESTIONNAIRE - PHQ9
CLINICAL INTERPRETATION OF PHQ2 SCORE: NO FURTHER SCREENING NEEDED
IS PATIENT ABLE TO COMPLETE PHQ2 OR PHQ9: YES
SUM OF ALL RESPONSES TO PHQ9 QUESTIONS 1 AND 2: 1
SUM OF ALL RESPONSES TO PHQ9 QUESTIONS 1 AND 2: 1
2. FEELING DOWN, DEPRESSED OR HOPELESS: SEVERAL DAYS
1. LITTLE INTEREST OR PLEASURE IN DOING THINGS: NOT AT ALL

## 2025-06-17 ASSESSMENT — COLUMBIA-SUICIDE SEVERITY RATING SCALE - C-SSRS
2. HAVE YOU ACTUALLY HAD ANY THOUGHTS OF KILLING YOURSELF?: NO
6. HAVE YOU EVER DONE ANYTHING, STARTED TO DO ANYTHING, OR PREPARED TO DO ANYTHING TO END YOUR LIFE?: NO
IS THE PATIENT ABLE TO COMPLETE C-SSRS: YES
1. WITHIN THE PAST MONTH, HAVE YOU WISHED YOU WERE DEAD OR WISHED YOU COULD GO TO SLEEP AND NOT WAKE UP?: NO

## 2025-06-18 VITALS
TEMPERATURE: 97.7 F | BODY MASS INDEX: 21.05 KG/M2 | RESPIRATION RATE: 18 BRPM | HEIGHT: 63 IN | OXYGEN SATURATION: 100 % | HEART RATE: 65 BPM | SYSTOLIC BLOOD PRESSURE: 129 MMHG | DIASTOLIC BLOOD PRESSURE: 72 MMHG | WEIGHT: 118.83 LBS

## 2025-06-18 LAB
ALBUMIN SERPL-MCNC: 3.1 G/DL (ref 3.4–5)
ALBUMIN/GLOB SERPL: 0.8 {RATIO} (ref 1–2.4)
ALP SERPL-CCNC: 153 UNITS/L (ref 45–117)
ALT SERPL-CCNC: 40 UNITS/L
ANION GAP SERPL CALC-SCNC: 10 MMOL/L (ref 7–19)
AST SERPL-CCNC: 34 UNITS/L
BASOPHILS # BLD: 0 K/MCL (ref 0–0.3)
BASOPHILS NFR BLD: 0 %
BILIRUB SERPL-MCNC: 0.4 MG/DL (ref 0.2–1)
BUN SERPL-MCNC: 48 MG/DL (ref 6–20)
BUN/CREAT SERPL: 9 (ref 7–25)
CALCIUM SERPL-MCNC: 8.7 MG/DL (ref 8.4–10.2)
CHLORIDE SERPL-SCNC: 105 MMOL/L (ref 97–110)
CO2 SERPL-SCNC: 28 MMOL/L (ref 21–32)
CREAT SERPL-MCNC: 5.26 MG/DL (ref 0.67–1.17)
DEPRECATED RDW RBC: 52.3 FL (ref 39–50)
EGFRCR SERPLBLD CKD-EPI 2021: 11 ML/MIN/{1.73_M2}
EOSINOPHIL # BLD: 0.1 K/MCL (ref 0–0.5)
EOSINOPHIL NFR BLD: 2 %
ERYTHROCYTE [DISTWIDTH] IN BLOOD: 16.5 % (ref 11–15)
FASTING DURATION TIME PATIENT: ABNORMAL H
GLOBULIN SER-MCNC: 4.1 G/DL (ref 2–4)
GLUCOSE SERPL-MCNC: 147 MG/DL (ref 70–99)
HCT VFR BLD CALC: 36.5 % (ref 39–51)
HGB BLD-MCNC: 11.6 G/DL (ref 13–17)
IMM GRANULOCYTES # BLD AUTO: 0 K/MCL (ref 0–0.2)
IMM GRANULOCYTES # BLD: 0 %
LYMPHOCYTES # BLD: 1.6 K/MCL (ref 1–4)
LYMPHOCYTES NFR BLD: 27 %
MCH RBC QN AUTO: 28 PG (ref 26–34)
MCHC RBC AUTO-ENTMCNC: 31.8 G/DL (ref 32–36.5)
MCV RBC AUTO: 88 FL (ref 78–100)
MONOCYTES # BLD: 0.9 K/MCL (ref 0.3–0.9)
MONOCYTES NFR BLD: 16 %
NEUTROPHILS # BLD: 3.2 K/MCL (ref 1.8–7.7)
NEUTROPHILS NFR BLD: 55 %
NRBC BLD MANUAL-RTO: 0 /100 WBC
PLATELET # BLD AUTO: 160 K/MCL (ref 140–450)
POTASSIUM SERPL-SCNC: 4.6 MMOL/L (ref 3.4–5.1)
PROT SERPL-MCNC: 7.2 G/DL (ref 6.4–8.2)
RBC # BLD: 4.15 MIL/MCL (ref 4.5–5.9)
SODIUM SERPL-SCNC: 138 MMOL/L (ref 135–145)
WBC # BLD: 5.8 K/MCL (ref 4.2–11)

## 2025-06-18 PROCEDURE — G0378 HOSPITAL OBSERVATION PER HR: HCPCS

## 2025-06-18 PROCEDURE — 10002800 HB RX 250 W HCPCS: Performed by: STUDENT IN AN ORGANIZED HEALTH CARE EDUCATION/TRAINING PROGRAM

## 2025-06-18 PROCEDURE — 36415 COLL VENOUS BLD VENIPUNCTURE: CPT | Performed by: STUDENT IN AN ORGANIZED HEALTH CARE EDUCATION/TRAINING PROGRAM

## 2025-06-18 PROCEDURE — 80053 COMPREHEN METABOLIC PANEL: CPT | Performed by: STUDENT IN AN ORGANIZED HEALTH CARE EDUCATION/TRAINING PROGRAM

## 2025-06-18 PROCEDURE — 96372 THER/PROPH/DIAG INJ SC/IM: CPT | Performed by: STUDENT IN AN ORGANIZED HEALTH CARE EDUCATION/TRAINING PROGRAM

## 2025-06-18 PROCEDURE — 85025 COMPLETE CBC W/AUTO DIFF WBC: CPT | Performed by: STUDENT IN AN ORGANIZED HEALTH CARE EDUCATION/TRAINING PROGRAM

## 2025-06-18 PROCEDURE — 10004651 HB RX, NO CHARGE ITEM: Performed by: STUDENT IN AN ORGANIZED HEALTH CARE EDUCATION/TRAINING PROGRAM

## 2025-06-18 RX ORDER — TAMSULOSIN HYDROCHLORIDE 0.4 MG/1
0.4 CAPSULE ORAL
Status: ON HOLD | COMMUNITY
End: 2025-06-18 | Stop reason: HOSPADM

## 2025-06-18 RX ORDER — ASPIRIN 81 MG/1
81 TABLET ORAL DAILY
COMMUNITY

## 2025-06-18 RX ORDER — SEVELAMER CARBONATE 800 MG/1
2400 TABLET, FILM COATED ORAL
COMMUNITY

## 2025-06-18 RX ORDER — AMLODIPINE BESYLATE 5 MG/1
5 TABLET ORAL DAILY
Status: ON HOLD | COMMUNITY
End: 2025-06-18 | Stop reason: HOSPADM

## 2025-06-18 RX ORDER — LEVOTHYROXINE SODIUM 75 UG/1
75 TABLET ORAL DAILY
COMMUNITY

## 2025-06-18 RX ORDER — ROSUVASTATIN CALCIUM 10 MG/1
10 TABLET, COATED ORAL DAILY
COMMUNITY

## 2025-06-18 RX ORDER — FAMOTIDINE 20 MG/1
20 TABLET, FILM COATED ORAL 2 TIMES DAILY
COMMUNITY

## 2025-06-18 RX ORDER — FOLIC ACID 1 MG/1
1 TABLET ORAL DAILY
COMMUNITY

## 2025-06-18 RX ORDER — BUMETANIDE 1 MG/1
1 TABLET ORAL 2 TIMES DAILY
Status: ON HOLD | COMMUNITY
End: 2025-06-18 | Stop reason: HOSPADM

## 2025-06-18 RX ADMIN — SODIUM CHLORIDE, PRESERVATIVE FREE 2 ML: 5 INJECTION INTRAVENOUS at 09:03

## 2025-06-18 RX ADMIN — HEPARIN SODIUM 5000 UNITS: 5000 INJECTION INTRAVENOUS; SUBCUTANEOUS at 05:31

## 2025-06-18 ASSESSMENT — ENCOUNTER SYMPTOMS
CONSTITUTIONAL NEGATIVE: 1
ALLERGIC/IMMUNOLOGIC NEGATIVE: 1
RESPIRATORY NEGATIVE: 1
ENDOCRINE NEGATIVE: 1
GASTROINTESTINAL NEGATIVE: 1
EYES NEGATIVE: 1
HEMATOLOGIC/LYMPHATIC NEGATIVE: 1
PSYCHIATRIC NEGATIVE: 1
NEUROLOGICAL NEGATIVE: 1

## 2025-06-18 ASSESSMENT — ORIENTATION MEMORY CONCENTRATION TEST (OMCT)
WHAT YEAR IS IT NOW (MUST BE EXACT): CORRECT
WHAT MONTH IS IT NOW: CORRECT
REPEAT THE NAME AND ADDRESS I ASKED YOU TO REMEMBER: CORRECT
OMCT INTERPRETATION: 0-6: NO SIGNIFICANT IMPAIRMENT
WHAT TIME IS IT (NO WATCH OR CLOCK): CORRECT
COUNT BACKWARDS FROM 20 TO 1: 1 ERROR
SAY THE MONTHS IN REVERSE ORDER STARTING WITH LAST MONTH: CORRECT
OMCT SCORE: 2

## 2025-06-18 ASSESSMENT — PAIN SCALES - GENERAL: PAINLEVEL_OUTOF10: 0

## 2025-06-20 ENCOUNTER — TELEPHONE (OUTPATIENT)
Dept: CARE COORDINATION | Age: 75
End: 2025-06-20

## 2025-06-21 ENCOUNTER — TELEPHONE (OUTPATIENT)
Dept: CARE COORDINATION | Age: 75
End: 2025-06-21

## 2025-06-27 ENCOUNTER — TELEPHONE (OUTPATIENT)
Dept: CARE COORDINATION | Age: 75
End: 2025-06-27

## 2025-06-28 ENCOUNTER — TELEPHONE (OUTPATIENT)
Dept: CARE COORDINATION | Age: 75
End: 2025-06-28

## (undated) NOTE — LETTER
Patient: Desmond Woods  YOB: 1950  Date: 8/21/2023 Time: 10:10 PM    Leaving the [address] [place] [address] Advice    Chart #:[id number]    This will certify that I, the undersigned,    ______________________________________________________________________    A patient in the above named medical center, having requested discharge and removal from the medical center against the advice of my attending physician(s), hereby release [facility], its physicians, officers and employees, severally and individually, from any and all liability of any nature whatsoever for any injury or harm or complication of any kind that may result directly or indirectly, by reason of my terminating my stay as a patient at [facility] and my departure from [facility], and hereby waive any and all rigts of action I may now have or later acquire as a result of my voluntary departure from [facility] and the termination of my stay as a patient therein. This release is made with the full knowledge of the danger that may result from the action which I am taking.       Date:_______________________                         ___________________________                                                                                    Patient/Legal Representative    Witness:        ____________________________                          ___________________________  Nurse                                                                        Physician